# Patient Record
Sex: FEMALE | Race: OTHER | HISPANIC OR LATINO | URBAN - METROPOLITAN AREA
[De-identification: names, ages, dates, MRNs, and addresses within clinical notes are randomized per-mention and may not be internally consistent; named-entity substitution may affect disease eponyms.]

---

## 2017-11-29 ENCOUNTER — EMERGENCY (EMERGENCY)
Facility: HOSPITAL | Age: 42
LOS: 1 days | Discharge: HOME | End: 2017-11-29
Attending: EMERGENCY MEDICINE | Admitting: EMERGENCY MEDICINE
Payer: SELF-PAY

## 2017-11-29 VITALS
HEART RATE: 80 BPM | SYSTOLIC BLOOD PRESSURE: 120 MMHG | OXYGEN SATURATION: 100 % | RESPIRATION RATE: 14 BRPM | DIASTOLIC BLOOD PRESSURE: 70 MMHG

## 2017-11-29 VITALS
TEMPERATURE: 98 F | HEART RATE: 66 BPM | DIASTOLIC BLOOD PRESSURE: 80 MMHG | SYSTOLIC BLOOD PRESSURE: 136 MMHG | OXYGEN SATURATION: 100 % | RESPIRATION RATE: 16 BRPM

## 2017-11-29 DIAGNOSIS — Z90.49 ACQUIRED ABSENCE OF OTHER SPECIFIED PARTS OF DIGESTIVE TRACT: Chronic | ICD-10-CM

## 2017-11-29 LAB
ALBUMIN SERPL ELPH-MCNC: 4 G/DL — SIGNIFICANT CHANGE UP (ref 3.3–5)
ALP SERPL-CCNC: 64 U/L — SIGNIFICANT CHANGE UP (ref 40–120)
ALT FLD-CCNC: 22 U/L — SIGNIFICANT CHANGE UP (ref 4–33)
APPEARANCE UR: CLEAR — SIGNIFICANT CHANGE UP
AST SERPL-CCNC: 27 U/L — SIGNIFICANT CHANGE UP (ref 4–32)
BASOPHILS # BLD AUTO: 0.02 K/UL — SIGNIFICANT CHANGE UP (ref 0–0.2)
BASOPHILS NFR BLD AUTO: 0.3 % — SIGNIFICANT CHANGE UP (ref 0–2)
BILIRUB SERPL-MCNC: 0.2 MG/DL — SIGNIFICANT CHANGE UP (ref 0.2–1.2)
BILIRUB UR-MCNC: NEGATIVE — SIGNIFICANT CHANGE UP
BLOOD UR QL VISUAL: NEGATIVE — SIGNIFICANT CHANGE UP
BUN SERPL-MCNC: 19 MG/DL — SIGNIFICANT CHANGE UP (ref 7–23)
CALCIUM SERPL-MCNC: 8.9 MG/DL — SIGNIFICANT CHANGE UP (ref 8.4–10.5)
CHLORIDE SERPL-SCNC: 106 MMOL/L — SIGNIFICANT CHANGE UP (ref 98–107)
CO2 SERPL-SCNC: 25 MMOL/L — SIGNIFICANT CHANGE UP (ref 22–31)
COLOR SPEC: SIGNIFICANT CHANGE UP
CREAT SERPL-MCNC: 0.74 MG/DL — SIGNIFICANT CHANGE UP (ref 0.5–1.3)
EOSINOPHIL # BLD AUTO: 0.33 K/UL — SIGNIFICANT CHANGE UP (ref 0–0.5)
EOSINOPHIL NFR BLD AUTO: 4.5 % — SIGNIFICANT CHANGE UP (ref 0–6)
GLUCOSE SERPL-MCNC: 115 MG/DL — HIGH (ref 70–99)
GLUCOSE UR-MCNC: NEGATIVE — SIGNIFICANT CHANGE UP
HCT VFR BLD CALC: 32.4 % — LOW (ref 34.5–45)
HGB BLD-MCNC: 9.7 G/DL — LOW (ref 11.5–15.5)
IMM GRANULOCYTES # BLD AUTO: 0.01 # — SIGNIFICANT CHANGE UP
IMM GRANULOCYTES NFR BLD AUTO: 0.1 % — SIGNIFICANT CHANGE UP (ref 0–1.5)
KETONES UR-MCNC: NEGATIVE — SIGNIFICANT CHANGE UP
LEUKOCYTE ESTERASE UR-ACNC: NEGATIVE — SIGNIFICANT CHANGE UP
LIDOCAIN IGE QN: 55 U/L — SIGNIFICANT CHANGE UP (ref 7–60)
LYMPHOCYTES # BLD AUTO: 2.67 K/UL — SIGNIFICANT CHANGE UP (ref 1–3.3)
LYMPHOCYTES # BLD AUTO: 36.1 % — SIGNIFICANT CHANGE UP (ref 13–44)
MCHC RBC-ENTMCNC: 23.2 PG — LOW (ref 27–34)
MCHC RBC-ENTMCNC: 29.9 % — LOW (ref 32–36)
MCV RBC AUTO: 77.5 FL — LOW (ref 80–100)
MONOCYTES # BLD AUTO: 0.85 K/UL — SIGNIFICANT CHANGE UP (ref 0–0.9)
MONOCYTES NFR BLD AUTO: 11.5 % — SIGNIFICANT CHANGE UP (ref 2–14)
MUCOUS THREADS # UR AUTO: SIGNIFICANT CHANGE UP
NEUTROPHILS # BLD AUTO: 3.51 K/UL — SIGNIFICANT CHANGE UP (ref 1.8–7.4)
NEUTROPHILS NFR BLD AUTO: 47.5 % — SIGNIFICANT CHANGE UP (ref 43–77)
NITRITE UR-MCNC: NEGATIVE — SIGNIFICANT CHANGE UP
NRBC # FLD: 0.02 — SIGNIFICANT CHANGE UP
PH UR: 6 — SIGNIFICANT CHANGE UP (ref 4.6–8)
PLATELET # BLD AUTO: 210 K/UL — SIGNIFICANT CHANGE UP (ref 150–400)
PMV BLD: 11.9 FL — SIGNIFICANT CHANGE UP (ref 7–13)
POTASSIUM SERPL-MCNC: 3.9 MMOL/L — SIGNIFICANT CHANGE UP (ref 3.5–5.3)
POTASSIUM SERPL-SCNC: 3.9 MMOL/L — SIGNIFICANT CHANGE UP (ref 3.5–5.3)
PROT SERPL-MCNC: 6.8 G/DL — SIGNIFICANT CHANGE UP (ref 6–8.3)
PROT UR-MCNC: 10 — SIGNIFICANT CHANGE UP
RBC # BLD: 4.18 M/UL — SIGNIFICANT CHANGE UP (ref 3.8–5.2)
RBC # FLD: 17.6 % — HIGH (ref 10.3–14.5)
RBC CASTS # UR COMP ASSIST: SIGNIFICANT CHANGE UP (ref 0–?)
SODIUM SERPL-SCNC: 142 MMOL/L — SIGNIFICANT CHANGE UP (ref 135–145)
SP GR SPEC: 1.02 — SIGNIFICANT CHANGE UP (ref 1–1.03)
SQUAMOUS # UR AUTO: SIGNIFICANT CHANGE UP
UROBILINOGEN FLD QL: NORMAL E.U. — SIGNIFICANT CHANGE UP (ref 0.1–0.2)
WBC # BLD: 7.39 K/UL — SIGNIFICANT CHANGE UP (ref 3.8–10.5)
WBC # FLD AUTO: 7.39 K/UL — SIGNIFICANT CHANGE UP (ref 3.8–10.5)
WBC UR QL: SIGNIFICANT CHANGE UP (ref 0–?)

## 2017-11-29 PROCEDURE — 76830 TRANSVAGINAL US NON-OB: CPT | Mod: 26

## 2017-11-29 PROCEDURE — 74177 CT ABD & PELVIS W/CONTRAST: CPT | Mod: 26

## 2017-11-29 PROCEDURE — 99053 MED SERV 10PM-8AM 24 HR FAC: CPT

## 2017-11-29 PROCEDURE — 99284 EMERGENCY DEPT VISIT MOD MDM: CPT | Mod: 25

## 2017-11-29 RX ORDER — SODIUM CHLORIDE 9 MG/ML
1000 INJECTION INTRAMUSCULAR; INTRAVENOUS; SUBCUTANEOUS ONCE
Qty: 0 | Refills: 0 | Status: COMPLETED | OUTPATIENT
Start: 2017-11-29 | End: 2017-11-29

## 2017-11-29 RX ADMIN — SODIUM CHLORIDE 1000 MILLILITER(S): 9 INJECTION INTRAMUSCULAR; INTRAVENOUS; SUBCUTANEOUS at 07:28

## 2017-11-29 NOTE — ED PROVIDER NOTE - ATTENDING CONTRIBUTION TO CARE
pt presented with RLQ pain that radiated to the back.  Pt Indonesian speaking only but wanted family member to translate.  Never had this type of pain before.  No urinary symptoms.  Some nausea but no fevers or vomiting.  Gen: Well appearing in NAD  Head: NC/AT  Neck: trachea midline  Resp:  No distress  Abd: TTP in the RLQ and mild suprapubic TTP.  No CVA TTP  Ext: no deformities  Neuro:  A&O appears non focal  Skin:  Warm and dry as visualized  Psych:  Normal affect and mood     Pt w pain need to be concerned about possible appy and if not an appy must further consider the possibility of ovarian pathology.  Will start with CT as pt has tubal ligation is not looking to reproduce.  If CT negative will move to TVUS.  Will dispo based on the results of labs and imaging.

## 2017-11-29 NOTE — ED PROVIDER NOTE - MEDICAL DECISION MAKING DETAILS
41 yo F, nonsmoker, with PMH of pre-DM, h/o cholecystectomy (3-4 years ago), h/o constipation presents to ER c/o worsening RLQ pain with radiating to back today.  +RLQ tenderness, no urinary symptoms, no vaginal bleeding/discharge, will obtain Labs, UA, and CT A/P IV contrast, re-assess

## 2017-11-29 NOTE — ED PROVIDER NOTE - PROGRESS NOTE DETAILS
JULIO Dumont: agree with above, pt 41 yo F here for RLQ pain x 1 day. Denies fever chills vomiting diarrhea. + RLQ tenderness on exam. no pelvic complaints, denies vaginal bleeding/discharge. LMP 1 week ago. Will obtain labs, urine, CT. PA KATHARINA: patient lying in bed in NAD. CT Abd/pelvis with normal appendix. TVUS pending. JULIO Galindo - pt signed out to me by JULIO Jones at the shift change - RLQ pain, ct neg, TVUS pending, pt well appearing, afebrile, reports improvement of pain, will continue to monitor. JULIO Galindo - pt signed out to me by JULIO Jones at the shift change - RLQ pain, ct neg, TVUS pending, pt well appearing, afebrile, reports improvement of pain, will continue to monitor. PT NOTED TO be anemic, H&H not significantly changed compared to previous visits, admits to heavy periods, will advise to take iron pills.

## 2017-11-29 NOTE — ED PROVIDER NOTE - PHYSICAL EXAMINATION
GYN exam: +right adnexal tenderness, no cervical motion tenderness, no bleeding or discharge.    Chaperone: JULIO Dumont GYN exam: +right adnexal tenderness, no left adnexal tenderness, no cervical motion tenderness, no bleeding or discharge.    Chaperone: JULIO Dumont GYN Bimanual exam: +right adnexal tenderness, no left adnexal tenderness, no cervical motion tenderness, no bleeding or discharge.    Chaperone: JULIO Dumont

## 2017-11-29 NOTE — ED PROVIDER NOTE - CARE PLAN
Principal Discharge DX:	Abdominal pain Principal Discharge DX:	Abdominal pain  Instructions for follow-up, activity and diet:	PLEASE MAKE SURE THAT YOU START TAKING IRON PILLS (THEY ARE OVER-THE-COUNTER - TAKE THEM TWICE DAILY, YOU MAY TAKE IBUPROFEN 600MG EVERY 8HRS OR TYLENOL 650MG EVERY 6HRS AS NEEDED FOR THE PAIN, START SEEING PRIMARY CARE DOCTOR AS WELL AS GYN DOCTOR AT OUR CLINIC ([phone]). RETURN TO ER FOR FEVER, CHILLS, WORSENING PAIN, BLOOD IN STOOL, DARK STOOLS OR ANY CONCERNS.

## 2017-11-29 NOTE — ED PROVIDER NOTE - PLAN OF CARE
PLEASE MAKE SURE THAT YOU START TAKING IRON PILLS (THEY ARE OVER-THE-COUNTER - TAKE THEM TWICE DAILY, YOU MAY TAKE IBUPROFEN 600MG EVERY 8HRS OR TYLENOL 650MG EVERY 6HRS AS NEEDED FOR THE PAIN, START SEEING PRIMARY CARE DOCTOR AS WELL AS GYN DOCTOR AT OUR CLINIC ([phone]). RETURN TO ER FOR FEVER, CHILLS, WORSENING PAIN, BLOOD IN STOOL, DARK STOOLS OR ANY CONCERNS.

## 2017-11-29 NOTE — ED PROVIDER NOTE - CHPI ED SYMPTOMS NEG
no dysuria/no fever/no blood in stool/no diarrhea/no nausea/no vomiting/no chills/no hematuria/no burning urination

## 2017-11-29 NOTE — ED PROVIDER NOTE - OBJECTIVE STATEMENT
Patient refused ,  wants to translate in Nepali.    43 yo F, nonsmoker, with PMH of pre-DM, h/o cholecystectomy (3-4 years ago), h/o constipation presents to ER c/o worsening RLQ pain with radiating to back today. Pt reports having gradual onset, constant, pinching, "feels like menstrual pain", 7/10, with radiation to back, worse with movement and lying on right side, better with lying down. States taking x2 Tylenol without improvement. denies any fever, chills, n/v/d, dysuria, burning sensation, hematuria, melena, blood in stool, chest pain, SOB, h/o renal stones, or any other complaints.   LMP: last thursday Patient refused ,  wants to translate in Setswana.    43 yo F, nonsmoker, with PMH of pre-DM, h/o cholecystectomy (3-4 years ago), h/o constipation presents to ER c/o worsening RLQ pain with radiating to back today. Pt reports having gradual onset, constant, pinching, "feels like menstrual pain", 7/10, with radiation to back, worse with movement and lying on right side, better with lying down. States taking x2 Tylenol without improvement. Last BM was yesterday, regular, no blood. denies any fever, chills, n/v/d, dysuria, burning sensation, hematuria, melena, blood in stool, chest pain, SOB, h/o renal stones, weight loss, recent travel, or any other complaints.   LMP: last thursday

## 2017-11-30 LAB
BACTERIA UR CULT: SIGNIFICANT CHANGE UP
C TRACH RRNA SPEC QL NAA+PROBE: SIGNIFICANT CHANGE UP
N GONORRHOEA RRNA SPEC QL NAA+PROBE: SIGNIFICANT CHANGE UP
SPECIMEN SOURCE: SIGNIFICANT CHANGE UP
SPECIMEN SOURCE: SIGNIFICANT CHANGE UP

## 2017-12-12 ENCOUNTER — APPOINTMENT (OUTPATIENT)
Dept: OBGYN | Facility: CLINIC | Age: 42
End: 2017-12-12

## 2018-05-10 ENCOUNTER — RESULT CHARGE (OUTPATIENT)
Age: 43
End: 2018-05-10

## 2018-05-10 ENCOUNTER — OUTPATIENT (OUTPATIENT)
Dept: OUTPATIENT SERVICES | Facility: HOSPITAL | Age: 43
LOS: 1 days | End: 2018-05-10
Payer: SELF-PAY

## 2018-05-10 ENCOUNTER — LABORATORY RESULT (OUTPATIENT)
Age: 43
End: 2018-05-10

## 2018-05-10 ENCOUNTER — APPOINTMENT (OUTPATIENT)
Dept: OBGYN | Facility: CLINIC | Age: 43
End: 2018-05-10
Payer: SELF-PAY

## 2018-05-10 VITALS — WEIGHT: 179 LBS | SYSTOLIC BLOOD PRESSURE: 160 MMHG | BODY MASS INDEX: 34.96 KG/M2 | DIASTOLIC BLOOD PRESSURE: 100 MMHG

## 2018-05-10 VITALS — DIASTOLIC BLOOD PRESSURE: 88 MMHG | SYSTOLIC BLOOD PRESSURE: 138 MMHG

## 2018-05-10 DIAGNOSIS — N93.0 POSTCOITAL AND CONTACT BLEEDING: ICD-10-CM

## 2018-05-10 DIAGNOSIS — N76.0 ACUTE VAGINITIS: ICD-10-CM

## 2018-05-10 DIAGNOSIS — Z90.49 ACQUIRED ABSENCE OF OTHER SPECIFIED PARTS OF DIGESTIVE TRACT: Chronic | ICD-10-CM

## 2018-05-10 LAB — HCG UR QL: NEGATIVE

## 2018-05-10 PROCEDURE — 81025 URINE PREGNANCY TEST: CPT | Mod: NC

## 2018-05-10 PROCEDURE — 99203 OFFICE O/P NEW LOW 30 MIN: CPT | Mod: NC

## 2018-05-10 PROCEDURE — 87624 HPV HI-RISK TYP POOLED RSLT: CPT

## 2018-05-10 PROCEDURE — 81025 URINE PREGNANCY TEST: CPT

## 2018-05-10 PROCEDURE — 88175 CYTOPATH C/V AUTO FLUID REDO: CPT

## 2018-05-10 PROCEDURE — G0463: CPT

## 2018-05-11 LAB
C TRACH RRNA SPEC QL NAA+PROBE: SIGNIFICANT CHANGE UP
HPV HIGH+LOW RISK DNA PNL CVX: SIGNIFICANT CHANGE UP
N GONORRHOEA RRNA SPEC QL NAA+PROBE: SIGNIFICANT CHANGE UP
SPECIMEN SOURCE: SIGNIFICANT CHANGE UP

## 2018-05-14 LAB — CYTOLOGY SPEC DOC CYTO: SIGNIFICANT CHANGE UP

## 2018-05-17 DIAGNOSIS — N93.0 POSTCOITAL AND CONTACT BLEEDING: ICD-10-CM

## 2018-05-17 DIAGNOSIS — N92.0 EXCESSIVE AND FREQUENT MENSTRUATION WITH REGULAR CYCLE: ICD-10-CM

## 2018-05-17 DIAGNOSIS — Z11.3 ENCOUNTER FOR SCREENING FOR INFECTIONS WITH A PREDOMINANTLY SEXUAL MODE OF TRANSMISSION: ICD-10-CM

## 2018-05-17 DIAGNOSIS — Z01.419 ENCOUNTER FOR GYNECOLOGICAL EXAMINATION (GENERAL) (ROUTINE) WITHOUT ABNORMAL FINDINGS: ICD-10-CM

## 2018-05-18 ENCOUNTER — OUTPATIENT (OUTPATIENT)
Dept: OUTPATIENT SERVICES | Facility: HOSPITAL | Age: 43
LOS: 1 days | End: 2018-05-18
Payer: SELF-PAY

## 2018-05-18 ENCOUNTER — APPOINTMENT (OUTPATIENT)
Dept: INTERNAL MEDICINE | Facility: CLINIC | Age: 43
End: 2018-05-18

## 2018-05-18 VITALS
DIASTOLIC BLOOD PRESSURE: 80 MMHG | SYSTOLIC BLOOD PRESSURE: 120 MMHG | WEIGHT: 178 LBS | BODY MASS INDEX: 34.95 KG/M2 | HEIGHT: 60 IN

## 2018-05-18 DIAGNOSIS — Z83.3 FAMILY HISTORY OF DIABETES MELLITUS: ICD-10-CM

## 2018-05-18 DIAGNOSIS — Z82.49 FAMILY HISTORY OF ISCHEMIC HEART DISEASE AND OTHER DISEASES OF THE CIRCULATORY SYSTEM: ICD-10-CM

## 2018-05-18 DIAGNOSIS — Z90.49 ACQUIRED ABSENCE OF OTHER SPECIFIED PARTS OF DIGESTIVE TRACT: Chronic | ICD-10-CM

## 2018-05-18 DIAGNOSIS — I10 ESSENTIAL (PRIMARY) HYPERTENSION: ICD-10-CM

## 2018-05-18 PROCEDURE — 90715 TDAP VACCINE 7 YRS/> IM: CPT

## 2018-05-18 PROCEDURE — G0463: CPT

## 2018-05-18 PROCEDURE — 90471 IMMUNIZATION ADMIN: CPT

## 2018-05-18 NOTE — PHYSICAL EXAM
[No Acute Distress] : no acute distress [Well Nourished] : well nourished [Well Developed] : well developed [Well-Appearing] : well-appearing [Normal Sclera/Conjunctiva] : normal sclera/conjunctiva [EOMI] : extraocular movements intact [Normal Outer Ear/Nose] : the outer ears and nose were normal in appearance [Supple] : supple [No Respiratory Distress] : no respiratory distress  [Clear to Auscultation] : lungs were clear to auscultation bilaterally [No Accessory Muscle Use] : no accessory muscle use [Normal Rate] : normal rate  [Regular Rhythm] : with a regular rhythm [Normal S1, S2] : normal S1 and S2 [No Murmur] : no murmur heard [No Edema] : there was no peripheral edema [Soft] : abdomen soft [Non Tender] : non-tender [Non-distended] : non-distended [No Masses] : no abdominal mass palpated [No HSM] : no HSM [Normal Bowel Sounds] : normal bowel sounds [Normal Posterior Cervical Nodes] : no posterior cervical lymphadenopathy [Normal Anterior Cervical Nodes] : no anterior cervical lymphadenopathy [No Spinal Tenderness] : no spinal tenderness [Grossly Normal Strength/Tone] : grossly normal strength/tone [No Rash] : no rash [Normal Gait] : normal gait [Coordination Grossly Intact] : coordination grossly intact [No Focal Deficits] : no focal deficits [Deep Tendon Reflexes (DTR)] : deep tendon reflexes were 2+ and symmetric [Normal Affect] : the affect was normal [Normal Insight/Judgement] : insight and judgment were intact [No Lymphadenopathy] : no lymphadenopathy

## 2018-05-21 NOTE — HISTORY OF PRESENT ILLNESS
[FreeTextEntry8] : [place]  Nichol #[id number]\par \par Patient is a 42 yo F w/PMH of prediabetes presenting to establish care. Patient is 30 minutes late to her appointment so was seen for an acute visit only. She reports that she was previously on medication for prediabetes but has not taken it in a while. She otherwise takes no medications at home\par \par She reports that she was sent by the gynecologist for elevated BP and abdominal bloating/constipation.\par \par She is a never smoker, no EtOH, no recreational drug use. She lives with her . She does not currently work. She is sexually active with her  and is interested in being tested for STIs.\par \par LMP was 5/7. She reports once monthly periods however not completely regular lasting 10 days each.\par \par She reports her parents have hypertension. Her uncle had diabetes.

## 2018-05-21 NOTE — ASSESSMENT
[FreeTextEntry1] : Patient is a 42 yo F w/PMH of prediabetes presenting to establish care\par  \par 1. HCM\par - last pap 5/2018 negative, HRHPV neg\par - mammo referral given at GYN clinic\par - TdaP today\par - CBC, A1c, CMP, Lipid profile, HIV, Syphillis screen today\par \par dw Dr. Lundberg\par \par Sapphire Lara, R2

## 2018-05-21 NOTE — REVIEW OF SYSTEMS
[Constipation] : constipation [Fever] : no fever [Chills] : no chills [Discharge] : no discharge [Pain] : no pain [Itching] : no itching [Earache] : no earache [Hearing Loss] : no hearing loss [Sore Throat] : no sore throat [Chest Pain] : no chest pain [Palpitations] : no palpitations [Shortness Of Breath] : no shortness of breath [Wheezing] : no wheezing [Cough] : no cough [Abdominal Pain] : no abdominal pain [Nausea] : no nausea [Vomiting] : no vomiting [Dysuria] : no dysuria [Joint Pain] : no joint pain [Muscle Pain] : no muscle pain [Skin Rash] : no skin rash [Headache] : no headache [Anxiety] : no anxiety [Depression] : no depression

## 2018-05-23 DIAGNOSIS — Z23 ENCOUNTER FOR IMMUNIZATION: ICD-10-CM

## 2018-05-23 DIAGNOSIS — Z82.49 FAMILY HISTORY OF ISCHEMIC HEART DISEASE AND OTHER DISEASES OF THE CIRCULATORY SYSTEM: ICD-10-CM

## 2018-05-23 DIAGNOSIS — Z83.3 FAMILY HISTORY OF DIABETES MELLITUS: ICD-10-CM

## 2018-05-23 DIAGNOSIS — R73.03 PREDIABETES: ICD-10-CM

## 2018-05-24 LAB
ALBUMIN SERPL ELPH-MCNC: 4.2 G/DL
ALP BLD-CCNC: 81 U/L
ALT SERPL-CCNC: 26 U/L
ANION GAP SERPL CALC-SCNC: 13 MMOL/L
AST SERPL-CCNC: 34 U/L
BASOPHILS # BLD AUTO: 0.01 K/UL
BASOPHILS NFR BLD AUTO: 0.1 %
BILIRUB SERPL-MCNC: <0.2 MG/DL
BUN SERPL-MCNC: 14 MG/DL
CALCIUM SERPL-MCNC: 9 MG/DL
CHLORIDE SERPL-SCNC: 104 MMOL/L
CHOLEST SERPL-MCNC: 168 MG/DL
CHOLEST/HDLC SERPL: 3.2 RATIO
CO2 SERPL-SCNC: 24 MMOL/L
CREAT SERPL-MCNC: 0.84 MG/DL
EOSINOPHIL # BLD AUTO: 0.34 K/UL
EOSINOPHIL NFR BLD AUTO: 4.2 %
GLUCOSE SERPL-MCNC: 109 MG/DL
HBA1C MFR BLD HPLC: 6.9 %
HCT VFR BLD CALC: 33.8 %
HDLC SERPL-MCNC: 52 MG/DL
HGB BLD-MCNC: 10.7 G/DL
HIV1+2 AB SPEC QL IA.RAPID: NONREACTIVE
IMM GRANULOCYTES NFR BLD AUTO: 0.1 %
LDLC SERPL CALC-MCNC: 84 MG/DL
LYMPHOCYTES # BLD AUTO: 2.99 K/UL
LYMPHOCYTES NFR BLD AUTO: 37.1 %
MAN DIFF?: NORMAL
MCHC RBC-ENTMCNC: 24.8 PG
MCHC RBC-ENTMCNC: 31.7 GM/DL
MCV RBC AUTO: 78.4 FL
MONOCYTES # BLD AUTO: 0.67 K/UL
MONOCYTES NFR BLD AUTO: 8.3 %
NEUTROPHILS # BLD AUTO: 4.03 K/UL
NEUTROPHILS NFR BLD AUTO: 50.2 %
PLATELET # BLD AUTO: 221 K/UL
POTASSIUM SERPL-SCNC: 4.8 MMOL/L
PROT SERPL-MCNC: 7.4 G/DL
RBC # BLD: 4.31 M/UL
RBC # FLD: 18.3 %
SODIUM SERPL-SCNC: 141 MMOL/L
T PALLIDUM AB SER QL IA: NEGATIVE
TRIGL SERPL-MCNC: 161 MG/DL
WBC # FLD AUTO: 8.05 K/UL

## 2018-07-23 ENCOUNTER — OUTPATIENT (OUTPATIENT)
Dept: OUTPATIENT SERVICES | Facility: HOSPITAL | Age: 43
LOS: 1 days | End: 2018-07-23
Payer: SELF-PAY

## 2018-07-23 ENCOUNTER — APPOINTMENT (OUTPATIENT)
Dept: INTERNAL MEDICINE | Facility: CLINIC | Age: 43
End: 2018-07-23
Payer: COMMERCIAL

## 2018-07-23 ENCOUNTER — MESSAGE (OUTPATIENT)
Age: 43
End: 2018-07-23

## 2018-07-23 VITALS
BODY MASS INDEX: 35.34 KG/M2 | HEIGHT: 60 IN | DIASTOLIC BLOOD PRESSURE: 70 MMHG | WEIGHT: 180 LBS | SYSTOLIC BLOOD PRESSURE: 140 MMHG

## 2018-07-23 DIAGNOSIS — Z90.49 ACQUIRED ABSENCE OF OTHER SPECIFIED PARTS OF DIGESTIVE TRACT: Chronic | ICD-10-CM

## 2018-07-23 DIAGNOSIS — R73.03 PREDIABETES: ICD-10-CM

## 2018-07-23 DIAGNOSIS — Z83.3 FAMILY HISTORY OF DIABETES MELLITUS: ICD-10-CM

## 2018-07-23 DIAGNOSIS — Z23 ENCOUNTER FOR IMMUNIZATION: ICD-10-CM

## 2018-07-23 DIAGNOSIS — I10 ESSENTIAL (PRIMARY) HYPERTENSION: ICD-10-CM

## 2018-07-23 PROCEDURE — 99213 OFFICE O/P EST LOW 20 MIN: CPT | Mod: GE

## 2018-07-23 NOTE — PHYSICAL EXAM
[No Acute Distress] : no acute distress [Well Nourished] : well nourished [Well Developed] : well developed [Well-Appearing] : well-appearing [Normal Sclera/Conjunctiva] : normal sclera/conjunctiva [Normal Outer Ear/Nose] : the outer ears and nose were normal in appearance [Supple] : supple [No Respiratory Distress] : no respiratory distress  [Clear to Auscultation] : lungs were clear to auscultation bilaterally [Normal Rate] : normal rate  [Regular Rhythm] : with a regular rhythm [Normal S1, S2] : normal S1 and S2 [No Murmur] : no murmur heard [No Edema] : there was no peripheral edema [Soft] : abdomen soft [Non Tender] : non-tender [Non-distended] : non-distended [No Masses] : no abdominal mass palpated [No HSM] : no HSM [Normal Bowel Sounds] : normal bowel sounds [Grossly Normal Strength/Tone] : grossly normal strength/tone [No Rash] : no rash [Normal Gait] : normal gait [Coordination Grossly Intact] : coordination grossly intact [No Focal Deficits] : no focal deficits [Deep Tendon Reflexes (DTR)] : deep tendon reflexes were 2+ and symmetric [Normal Affect] : the affect was normal [Normal Insight/Judgement] : insight and judgment were intact

## 2018-07-24 ENCOUNTER — FORM ENCOUNTER (OUTPATIENT)
Age: 43
End: 2018-07-24

## 2018-07-24 DIAGNOSIS — K59.09 OTHER CONSTIPATION: ICD-10-CM

## 2018-07-24 DIAGNOSIS — D64.9 ANEMIA, UNSPECIFIED: ICD-10-CM

## 2018-07-24 NOTE — ASSESSMENT
[FreeTextEntry1] : 42 yo F w/PMH of T2DM (A1c 6.9% in 5/2018) and microcytic anemia presenting for follow up appointment\par \par 1. Microcytic anemia\par - likely related to heavy menses\par - start ferrous sulfate 325mg daily with senna, colace and Miralax PRN for constipation\par - f/u GYN re: IUD placement\par \par 2. T2DM\par - c/w Metformin 500 QD\par \par RTC 3 months for repeat CBC\par \par dw Dr. Conrad\par \par Sapphire Lara, R2

## 2018-07-24 NOTE — REVIEW OF SYSTEMS
[Constipation] : constipation [Fever] : no fever [Chills] : no chills [Fatigue] : no fatigue [Night Sweats] : no night sweats [Discharge] : no discharge [Pain] : no pain [Hearing Loss] : no hearing loss [Sore Throat] : no sore throat [Shortness Of Breath] : no shortness of breath [Wheezing] : no wheezing [Nausea] : no nausea [Vomiting] : no vomiting [Melena] : no melena [Dysuria] : no dysuria [Hematuria] : no hematuria [Frequency] : no frequency [Joint Pain] : no joint pain [Muscle Pain] : no muscle pain [Fainting] : no fainting [Easy Bleeding] : no easy bleeding

## 2018-07-24 NOTE — HISTORY OF PRESENT ILLNESS
[de-identified] : Patient is a 42 yo F w/PMH of T2DM (A1c 6.9% in 5/2018) and microcytic anemia presenting for follow up appointment. On prior visit, CBC showed a microcytic anemia with Hb of 10.7. \par \par Patient reports very heavy menses. Reports that she bleeds for 10 days per month and is heavy most days. Reports 4-5 tampons a day. She reports this has been going on for 20 years. She reports it started after having her son 22 years ago. She reports she has been told she had anemia in the past but has never been put on iron supplements.\par \par Of note, she also reports chronic constipation for the same period of time. Denies melena, hematochezia, weight loss, early satiety. Reports some lower abdominal discomfort and feeling of bloating after meals occasionally. Notes last BM 4 days ago. Reports she uses Senna, has tried Colace and Dulcolax in the past but never Miralax.

## 2018-07-25 ENCOUNTER — LABORATORY RESULT (OUTPATIENT)
Age: 43
End: 2018-07-25

## 2018-07-25 ENCOUNTER — OUTPATIENT (OUTPATIENT)
Dept: OUTPATIENT SERVICES | Facility: HOSPITAL | Age: 43
LOS: 1 days | End: 2018-07-25
Payer: SELF-PAY

## 2018-07-25 ENCOUNTER — APPOINTMENT (OUTPATIENT)
Dept: ULTRASOUND IMAGING | Facility: HOSPITAL | Age: 43
End: 2018-07-25
Payer: COMMERCIAL

## 2018-07-25 ENCOUNTER — APPOINTMENT (OUTPATIENT)
Dept: OBGYN | Facility: CLINIC | Age: 43
End: 2018-07-25

## 2018-07-25 DIAGNOSIS — Z90.49 ACQUIRED ABSENCE OF OTHER SPECIFIED PARTS OF DIGESTIVE TRACT: Chronic | ICD-10-CM

## 2018-07-25 DIAGNOSIS — Z11.3 ENCOUNTER FOR SCREENING FOR INFECTIONS WITH A PREDOMINANTLY SEXUAL MODE OF TRANSMISSION: ICD-10-CM

## 2018-07-25 DIAGNOSIS — Z01.419 ENCOUNTER FOR GYNECOLOGICAL EXAMINATION (GENERAL) (ROUTINE) WITHOUT ABNORMAL FINDINGS: ICD-10-CM

## 2018-07-25 DIAGNOSIS — N92.0 EXCESSIVE AND FREQUENT MENSTRUATION WITH REGULAR CYCLE: ICD-10-CM

## 2018-07-25 DIAGNOSIS — N93.0 POSTCOITAL AND CONTACT BLEEDING: ICD-10-CM

## 2018-07-25 LAB — HCG UR QL: NEGATIVE — SIGNIFICANT CHANGE UP

## 2018-07-25 PROCEDURE — 58340 CATHETER FOR HYSTEROGRAPHY: CPT

## 2018-07-25 PROCEDURE — 76831 ECHO EXAM UTERUS: CPT

## 2018-07-25 PROCEDURE — G0463: CPT

## 2018-07-25 PROCEDURE — 81025 URINE PREGNANCY TEST: CPT

## 2018-07-25 PROCEDURE — 76831 ECHO EXAM UTERUS: CPT | Mod: 26

## 2018-07-30 ENCOUNTER — OUTPATIENT (OUTPATIENT)
Dept: OUTPATIENT SERVICES | Facility: HOSPITAL | Age: 43
LOS: 1 days | End: 2018-07-30
Payer: SELF-PAY

## 2018-07-30 ENCOUNTER — APPOINTMENT (OUTPATIENT)
Dept: OBGYN | Facility: CLINIC | Age: 43
End: 2018-07-30
Payer: COMMERCIAL

## 2018-07-30 DIAGNOSIS — Z90.49 ACQUIRED ABSENCE OF OTHER SPECIFIED PARTS OF DIGESTIVE TRACT: Chronic | ICD-10-CM

## 2018-07-30 DIAGNOSIS — N76.0 ACUTE VAGINITIS: ICD-10-CM

## 2018-07-30 PROCEDURE — 99213 OFFICE O/P EST LOW 20 MIN: CPT | Mod: GE

## 2018-07-30 PROCEDURE — G0463: CPT

## 2018-07-31 DIAGNOSIS — K59.09 OTHER CONSTIPATION: ICD-10-CM

## 2018-08-13 DIAGNOSIS — N92.0 EXCESSIVE AND FREQUENT MENSTRUATION WITH REGULAR CYCLE: ICD-10-CM

## 2018-09-06 ENCOUNTER — EMERGENCY (EMERGENCY)
Facility: HOSPITAL | Age: 43
LOS: 1 days | Discharge: HOME | End: 2018-09-06
Attending: EMERGENCY MEDICINE | Admitting: EMERGENCY MEDICINE
Payer: SELF-PAY

## 2018-09-06 VITALS
HEART RATE: 62 BPM | RESPIRATION RATE: 18 BRPM | SYSTOLIC BLOOD PRESSURE: 134 MMHG | DIASTOLIC BLOOD PRESSURE: 72 MMHG | TEMPERATURE: 98 F | OXYGEN SATURATION: 100 %

## 2018-09-06 VITALS
RESPIRATION RATE: 16 BRPM | DIASTOLIC BLOOD PRESSURE: 63 MMHG | HEART RATE: 74 BPM | SYSTOLIC BLOOD PRESSURE: 146 MMHG | OXYGEN SATURATION: 100 % | TEMPERATURE: 99 F

## 2018-09-06 DIAGNOSIS — Z90.49 ACQUIRED ABSENCE OF OTHER SPECIFIED PARTS OF DIGESTIVE TRACT: Chronic | ICD-10-CM

## 2018-09-06 DIAGNOSIS — Z98.51 TUBAL LIGATION STATUS: Chronic | ICD-10-CM

## 2018-09-06 LAB
ALBUMIN SERPL ELPH-MCNC: 4 G/DL — SIGNIFICANT CHANGE UP (ref 3.3–5)
ALP SERPL-CCNC: 90 U/L — SIGNIFICANT CHANGE UP (ref 40–120)
ALT FLD-CCNC: 22 U/L — SIGNIFICANT CHANGE UP (ref 4–33)
APPEARANCE UR: SIGNIFICANT CHANGE UP
AST SERPL-CCNC: 23 U/L — SIGNIFICANT CHANGE UP (ref 4–32)
BACTERIA # UR AUTO: HIGH
BASOPHILS # BLD AUTO: 0.02 K/UL — SIGNIFICANT CHANGE UP (ref 0–0.2)
BASOPHILS NFR BLD AUTO: 0.2 % — SIGNIFICANT CHANGE UP (ref 0–2)
BILIRUB SERPL-MCNC: < 0.2 MG/DL — LOW (ref 0.2–1.2)
BILIRUB UR-MCNC: NEGATIVE — SIGNIFICANT CHANGE UP
BLOOD UR QL VISUAL: HIGH
BUN SERPL-MCNC: 16 MG/DL — SIGNIFICANT CHANGE UP (ref 7–23)
CALCIUM SERPL-MCNC: 8.7 MG/DL — SIGNIFICANT CHANGE UP (ref 8.4–10.5)
CHLORIDE SERPL-SCNC: 102 MMOL/L — SIGNIFICANT CHANGE UP (ref 98–107)
CO2 SERPL-SCNC: 22 MMOL/L — SIGNIFICANT CHANGE UP (ref 22–31)
COLOR SPEC: YELLOW — SIGNIFICANT CHANGE UP
CREAT SERPL-MCNC: 0.73 MG/DL — SIGNIFICANT CHANGE UP (ref 0.5–1.3)
EOSINOPHIL # BLD AUTO: 0.3 K/UL — SIGNIFICANT CHANGE UP (ref 0–0.5)
EOSINOPHIL NFR BLD AUTO: 3.3 % — SIGNIFICANT CHANGE UP (ref 0–6)
GLUCOSE SERPL-MCNC: 159 MG/DL — HIGH (ref 70–99)
GLUCOSE UR-MCNC: 70 — SIGNIFICANT CHANGE UP
HCT VFR BLD CALC: 32.2 % — LOW (ref 34.5–45)
HGB BLD-MCNC: 9.9 G/DL — LOW (ref 11.5–15.5)
HYALINE CASTS # UR AUTO: HIGH
IMM GRANULOCYTES # BLD AUTO: 0.02 # — SIGNIFICANT CHANGE UP
IMM GRANULOCYTES NFR BLD AUTO: 0.2 % — SIGNIFICANT CHANGE UP (ref 0–1.5)
KETONES UR-MCNC: NEGATIVE — SIGNIFICANT CHANGE UP
LEUKOCYTE ESTERASE UR-ACNC: SIGNIFICANT CHANGE UP
LYMPHOCYTES # BLD AUTO: 3.02 K/UL — SIGNIFICANT CHANGE UP (ref 1–3.3)
LYMPHOCYTES # BLD AUTO: 32.8 % — SIGNIFICANT CHANGE UP (ref 13–44)
MCHC RBC-ENTMCNC: 23.5 PG — LOW (ref 27–34)
MCHC RBC-ENTMCNC: 30.7 % — LOW (ref 32–36)
MCV RBC AUTO: 76.5 FL — LOW (ref 80–100)
MONOCYTES # BLD AUTO: 0.99 K/UL — HIGH (ref 0–0.9)
MONOCYTES NFR BLD AUTO: 10.8 % — SIGNIFICANT CHANGE UP (ref 2–14)
NEUTROPHILS # BLD AUTO: 4.85 K/UL — SIGNIFICANT CHANGE UP (ref 1.8–7.4)
NEUTROPHILS NFR BLD AUTO: 52.7 % — SIGNIFICANT CHANGE UP (ref 43–77)
NITRITE UR-MCNC: NEGATIVE — SIGNIFICANT CHANGE UP
NRBC # FLD: 0 — SIGNIFICANT CHANGE UP
PH UR: 6.5 — SIGNIFICANT CHANGE UP (ref 5–8)
PLATELET # BLD AUTO: 210 K/UL — SIGNIFICANT CHANGE UP (ref 150–400)
PMV BLD: 11.8 FL — SIGNIFICANT CHANGE UP (ref 7–13)
POTASSIUM SERPL-MCNC: 3.9 MMOL/L — SIGNIFICANT CHANGE UP (ref 3.5–5.3)
POTASSIUM SERPL-SCNC: 3.9 MMOL/L — SIGNIFICANT CHANGE UP (ref 3.5–5.3)
PROT SERPL-MCNC: 7.4 G/DL — SIGNIFICANT CHANGE UP (ref 6–8.3)
PROT UR-MCNC: 100 — HIGH
RBC # BLD: 4.21 M/UL — SIGNIFICANT CHANGE UP (ref 3.8–5.2)
RBC # FLD: 16.6 % — HIGH (ref 10.3–14.5)
RBC CASTS # UR COMP ASSIST: >50 — HIGH (ref 0–?)
SODIUM SERPL-SCNC: 138 MMOL/L — SIGNIFICANT CHANGE UP (ref 135–145)
SP GR SPEC: 1.02 — SIGNIFICANT CHANGE UP (ref 1–1.04)
SQUAMOUS # UR AUTO: SIGNIFICANT CHANGE UP
UROBILINOGEN FLD QL: NORMAL — SIGNIFICANT CHANGE UP
WBC # BLD: 9.2 K/UL — SIGNIFICANT CHANGE UP (ref 3.8–10.5)
WBC # FLD AUTO: 9.2 K/UL — SIGNIFICANT CHANGE UP (ref 3.8–10.5)
WBC UR QL: >50 — HIGH (ref 0–?)

## 2018-09-06 PROCEDURE — 99053 MED SERV 10PM-8AM 24 HR FAC: CPT

## 2018-09-06 PROCEDURE — 76770 US EXAM ABDO BACK WALL COMP: CPT | Mod: 26

## 2018-09-06 PROCEDURE — 99284 EMERGENCY DEPT VISIT MOD MDM: CPT | Mod: 25

## 2018-09-06 RX ORDER — CIPROFLOXACIN LACTATE 400MG/40ML
500 VIAL (ML) INTRAVENOUS ONCE
Qty: 0 | Refills: 0 | Status: COMPLETED | OUTPATIENT
Start: 2018-09-06 | End: 2018-09-06

## 2018-09-06 RX ORDER — MOXIFLOXACIN HYDROCHLORIDE TABLETS, 400 MG 400 MG/1
1 TABLET, FILM COATED ORAL
Qty: 13 | Refills: 0
Start: 2018-09-06 | End: 2018-09-12

## 2018-09-06 RX ORDER — KETOROLAC TROMETHAMINE 30 MG/ML
15 SYRINGE (ML) INJECTION ONCE
Qty: 0 | Refills: 0 | Status: DISCONTINUED | OUTPATIENT
Start: 2018-09-06 | End: 2018-09-06

## 2018-09-06 RX ADMIN — Medication 15 MILLIGRAM(S): at 04:24

## 2018-09-06 RX ADMIN — Medication 500 MILLIGRAM(S): at 05:44

## 2018-09-06 RX ADMIN — Medication 15 MILLIGRAM(S): at 04:04

## 2018-09-06 NOTE — ED PROVIDER NOTE - OBJECTIVE STATEMENT
44 yo f with pmhx of diabetes mellitus and microcytic anemia who presents for left flank pain. She started having burning on urination on last Thursday, she started taking pyridium with little improvement. Yesterday she developed left flank pain around 7pm, took some acetaminophen without improvement. She denied any fever, chills, sob, nausea, vomiting, night sweats, hematuria, dizziness.

## 2018-09-06 NOTE — ED ADULT NURSE REASSESSMENT NOTE - NS ED NURSE REASSESS COMMENT FT1
Patient received AA&Ox3. Patient states that she is more comfortable at this time and does not need medication. VSS on RA. Patient denies chest pain, N/V, SOB, fever, chills, dyspnea, dizziness at this time. Patient awaiting results of USabd, NAD noted - will continue to monitor.

## 2018-09-06 NOTE — ED PROVIDER NOTE - NS ED ROS FT
Constitutional: No Fever, Fatigue, Weight Changes  Eyes: No recent vision problems or eye pain.  ENT: No congestion, ear pain, or sore throat.  Endocrine: No excess sweating, temperature intolerance  Cardiovascular: No chest pain, palpitations, shortness of breath, pre-syncope, syncope  Respiratory: No cough, congestion, or wheezing.  Gastrointestinal: No abdominal pain, nausea, vomiting, or diarrhea.  Genitourinary: Dysuria. No hematuria.   Musculoskeletal: No joint swelling, joint pain  Neurologic: No headache, dizziness, focal weakness  Skin: No rashes, hematoma, prupura

## 2018-09-06 NOTE — ED PROVIDER NOTE - PROGRESS NOTE DETAILS
Roberto Carlos - Pt signed out to me pending US results current Dx pyelonephritis plan for outpatient abx if US without hydro. Noted incidentaloma on US, spoke with pt regarding results as well as recommended follow up per radiology. Will give her copy of US report.

## 2018-09-06 NOTE — ED PROVIDER NOTE - MEDICAL DECISION MAKING DETAILS
42 yo f with pmhx of diabetes mellitus and microcytic anemia who presents for left flank pain. CVA tenderness on the left side. CBC, CMP, U/A. Urine culture. IM toradol.

## 2018-09-06 NOTE — ED ADULT TRIAGE NOTE - CHIEF COMPLAINT QUOTE
pt arrives w/ c/o left flank pain radiating to LLQ abdomen. pt states pain began last week. pt denies any nausea or vomiting. pt also c/o dysuria. pt states took Tylenol 2 hrs ago for pain with minimal relief.

## 2018-09-06 NOTE — DISCUSSION/SUMMARY
[FreeTextEntry1] : 43F h/o DM2, anemia who presented with L flank pain.  Pt thought to have pyelo without hydro on renal U/S.  Pt also found to have renal cyst that requires f/u.  Pt was discharged on ciprofloxacin.  Pt has appt 10/23/18.  Will task  team for earlier appointment for post-ED visit.

## 2018-09-06 NOTE — ED PROVIDER NOTE - ATTENDING CONTRIBUTION TO CARE
MD Garcia:  I performed a face to face bedside interview with patient regarding history of present illness, review of symptoms and past medical history. I completed an independent physical exam(documented below).  I have discussed patient's plan of care with resident.   I agree with note as stated above, having amended the EMR as needed to reflect my findings. I have discussed the assessment and plan of care.  This includes during the time I functioned as the attending physician for this patient.  PE:  Gen: Alert, mild distress  Head: NC, AT,  EOMI, normal lids/conjunctiva  ENT:  normal hearing, patent oropharynx without erythema/exudate  Neck: +supple, no tenderness/meningismus/JVD, +Trachea midline  Chest: no chest wall tenderness, equal chest rise  Pulm: Bilateral BS, normal resp effort, no wheeze/stridor/retractions  CV: RRR, no M/R/G, +dist pulses  Abd: +BS, soft, NT/ND  Back: +L cva ttp  Rectal: deferred  Mskel: no edema/erythema/cyanosis  Skin: no rash  Neuro: AAOx3  MDM:  42yo F w/ pmh of microcytic anemia and DM, c/o dysuria since last Thurs, progressed to include L flank pain ystdy, associated w/ nausea, no emesis. Denies hx of stones. Denies fever or chills. UA positive, awaiting US to r/o infected stone. If no hydro, dc w/ abx and outpt pmd f/u.

## 2018-09-07 PROBLEM — D64.9 ANEMIA, UNSPECIFIED: Chronic | Status: ACTIVE | Noted: 2018-09-06

## 2018-09-07 LAB — SPECIMEN SOURCE: SIGNIFICANT CHANGE UP

## 2018-09-09 LAB
-  AMIKACIN: SIGNIFICANT CHANGE UP
-  AMPICILLIN/SULBACTAM: SIGNIFICANT CHANGE UP
-  AMPICILLIN: SIGNIFICANT CHANGE UP
-  AZTREONAM: SIGNIFICANT CHANGE UP
-  CEFAZOLIN: SIGNIFICANT CHANGE UP
-  CEFEPIME: SIGNIFICANT CHANGE UP
-  CEFOXITIN: SIGNIFICANT CHANGE UP
-  CEFTAZIDIME: SIGNIFICANT CHANGE UP
-  CEFTRIAXONE: SIGNIFICANT CHANGE UP
-  CIPROFLOXACIN: SIGNIFICANT CHANGE UP
-  ERTAPENEM: SIGNIFICANT CHANGE UP
-  GENTAMICIN: SIGNIFICANT CHANGE UP
-  IMIPENEM: SIGNIFICANT CHANGE UP
-  LEVOFLOXACIN: SIGNIFICANT CHANGE UP
-  MEROPENEM: SIGNIFICANT CHANGE UP
-  NITROFURANTOIN: SIGNIFICANT CHANGE UP
-  PIPERACILLIN/TAZOBACTAM: SIGNIFICANT CHANGE UP
-  TIGECYCLINE: SIGNIFICANT CHANGE UP
-  TOBRAMYCIN: SIGNIFICANT CHANGE UP
-  TRIMETHOPRIM/SULFAMETHOXAZOLE: SIGNIFICANT CHANGE UP
BACTERIA UR CULT: SIGNIFICANT CHANGE UP
METHOD TYPE: SIGNIFICANT CHANGE UP
ORGANISM # SPEC MICROSCOPIC CNT: SIGNIFICANT CHANGE UP
ORGANISM # SPEC MICROSCOPIC CNT: SIGNIFICANT CHANGE UP

## 2018-09-11 ENCOUNTER — APPOINTMENT (OUTPATIENT)
Dept: INTERNAL MEDICINE | Facility: CLINIC | Age: 43
End: 2018-09-11

## 2018-09-11 ENCOUNTER — OUTPATIENT (OUTPATIENT)
Dept: OUTPATIENT SERVICES | Facility: HOSPITAL | Age: 43
LOS: 1 days | End: 2018-09-11
Payer: SELF-PAY

## 2018-09-11 VITALS
DIASTOLIC BLOOD PRESSURE: 70 MMHG | SYSTOLIC BLOOD PRESSURE: 120 MMHG | BODY MASS INDEX: 35.34 KG/M2 | HEIGHT: 60 IN | WEIGHT: 180 LBS

## 2018-09-11 DIAGNOSIS — Z90.49 ACQUIRED ABSENCE OF OTHER SPECIFIED PARTS OF DIGESTIVE TRACT: Chronic | ICD-10-CM

## 2018-09-11 DIAGNOSIS — I10 ESSENTIAL (PRIMARY) HYPERTENSION: ICD-10-CM

## 2018-09-11 DIAGNOSIS — K59.09 OTHER CONSTIPATION: ICD-10-CM

## 2018-09-11 DIAGNOSIS — Z98.51 TUBAL LIGATION STATUS: Chronic | ICD-10-CM

## 2018-09-11 LAB — HBA1C MFR BLD HPLC: 6.8

## 2018-09-11 PROCEDURE — 83036 HEMOGLOBIN GLYCOSYLATED A1C: CPT

## 2018-09-11 PROCEDURE — G0463: CPT

## 2018-09-12 NOTE — ASSESSMENT
[FreeTextEntry1] : 42 yo female with PMHx DM and anemia who comes in after ed visit.\par \par #DM \par - POCT HgbA1c 6.8 \par - c/w Metformin 500mg daily \par \par #Anemia \par - Ferrous sulfate prescribed \par - Miralax daily for constipation. \par - Follow up in a few months for repeat cbc to monitor anemia. \par \par #Complex renal cyst \par - US showed Left kidney complex cyst, for which they recommended follow up in 6 months with contrast enhanced MRI. \par - Pt also endorsed hyperpigmentation in her face, will continue monitoring. Will consider work up of acth if worsening. \par \par #HCM\par - Pneumovax today\par - Mammogram referral provided \par \par RTC in 4 months for cpe

## 2018-09-12 NOTE — REVIEW OF SYSTEMS
[Fatigue] : fatigue [Negative] : Heme/Lymph [Hot Flashes] : no hot flashes [Sore Throat] : no sore throat [Chest Pain] : no chest pain [Palpitations] : no palpitations [Dysuria] : no dysuria [Hematuria] : no hematuria

## 2018-09-12 NOTE — HISTORY OF PRESENT ILLNESS
[Spouse] : spouse [FreeTextEntry8] : 42 yo female with PMHx DM and anemia who comes in after ed visit. Patient was found R pyelonephritis, and is currently finishing antibiotics. She denied any fever, chills, night sweats. She endorses that her flank pain, and burning on urination have resolved. Endorses minimal abdominal discomfort located in her LLQ, she feels like a pulling sensation. She endorses that sometimes she sometimes takes metformin every other day, due to making her feel queezy. She denied any chest pain, abdominal pain, nausea, vomiting, diarrhea. Her menstrual period improved. Mother had ovarian cancer at age 49. Patient also endorses increased hyperpigmentation in her face. \par \par Of note, she underwent a [facility], which found Left kidney complex cyst, for which they recommended follow up in 6 months with contrast enhanced MRI.

## 2018-09-20 DIAGNOSIS — D64.9 ANEMIA, UNSPECIFIED: ICD-10-CM

## 2018-09-20 DIAGNOSIS — E11.9 TYPE 2 DIABETES MELLITUS WITHOUT COMPLICATIONS: ICD-10-CM

## 2018-09-20 DIAGNOSIS — N28.1 CYST OF KIDNEY, ACQUIRED: ICD-10-CM

## 2018-10-23 ENCOUNTER — APPOINTMENT (OUTPATIENT)
Dept: INTERNAL MEDICINE | Facility: CLINIC | Age: 43
End: 2018-10-23

## 2018-11-26 ENCOUNTER — FORM ENCOUNTER (OUTPATIENT)
Age: 43
End: 2018-11-26

## 2018-11-27 ENCOUNTER — APPOINTMENT (OUTPATIENT)
Dept: MAMMOGRAPHY | Facility: IMAGING CENTER | Age: 43
End: 2018-11-27
Payer: COMMERCIAL

## 2018-11-27 ENCOUNTER — OUTPATIENT (OUTPATIENT)
Dept: OUTPATIENT SERVICES | Facility: HOSPITAL | Age: 43
LOS: 1 days | End: 2018-11-27
Payer: SELF-PAY

## 2018-11-27 DIAGNOSIS — E11.9 TYPE 2 DIABETES MELLITUS WITHOUT COMPLICATIONS: ICD-10-CM

## 2018-11-27 DIAGNOSIS — D64.9 ANEMIA, UNSPECIFIED: ICD-10-CM

## 2018-11-27 DIAGNOSIS — Z98.51 TUBAL LIGATION STATUS: Chronic | ICD-10-CM

## 2018-11-27 DIAGNOSIS — Z00.8 ENCOUNTER FOR OTHER GENERAL EXAMINATION: ICD-10-CM

## 2018-11-27 DIAGNOSIS — N28.1 CYST OF KIDNEY, ACQUIRED: ICD-10-CM

## 2018-11-27 DIAGNOSIS — Z90.49 ACQUIRED ABSENCE OF OTHER SPECIFIED PARTS OF DIGESTIVE TRACT: Chronic | ICD-10-CM

## 2018-11-27 PROCEDURE — 77067 SCR MAMMO BI INCL CAD: CPT

## 2018-11-27 PROCEDURE — 77063 BREAST TOMOSYNTHESIS BI: CPT

## 2018-11-27 PROCEDURE — 77067 SCR MAMMO BI INCL CAD: CPT | Mod: 26

## 2018-11-27 PROCEDURE — 77063 BREAST TOMOSYNTHESIS BI: CPT | Mod: 26

## 2018-12-03 ENCOUNTER — FORM ENCOUNTER (OUTPATIENT)
Age: 43
End: 2018-12-03

## 2018-12-04 ENCOUNTER — OUTPATIENT (OUTPATIENT)
Dept: OUTPATIENT SERVICES | Facility: HOSPITAL | Age: 43
LOS: 1 days | End: 2018-12-04
Payer: SELF-PAY

## 2018-12-04 ENCOUNTER — APPOINTMENT (OUTPATIENT)
Dept: ULTRASOUND IMAGING | Facility: IMAGING CENTER | Age: 43
End: 2018-12-04
Payer: COMMERCIAL

## 2018-12-04 ENCOUNTER — APPOINTMENT (OUTPATIENT)
Dept: MAMMOGRAPHY | Facility: IMAGING CENTER | Age: 43
End: 2018-12-04
Payer: COMMERCIAL

## 2018-12-04 DIAGNOSIS — Z98.51 TUBAL LIGATION STATUS: Chronic | ICD-10-CM

## 2018-12-04 DIAGNOSIS — Z90.49 ACQUIRED ABSENCE OF OTHER SPECIFIED PARTS OF DIGESTIVE TRACT: Chronic | ICD-10-CM

## 2018-12-04 DIAGNOSIS — Z00.8 ENCOUNTER FOR OTHER GENERAL EXAMINATION: ICD-10-CM

## 2018-12-04 PROCEDURE — 77066 DX MAMMO INCL CAD BI: CPT | Mod: 26

## 2018-12-04 PROCEDURE — 76642 ULTRASOUND BREAST LIMITED: CPT | Mod: 26,50

## 2018-12-04 PROCEDURE — 77066 DX MAMMO INCL CAD BI: CPT

## 2018-12-04 PROCEDURE — 76642 ULTRASOUND BREAST LIMITED: CPT

## 2018-12-28 ENCOUNTER — APPOINTMENT (OUTPATIENT)
Dept: INTERNAL MEDICINE | Facility: CLINIC | Age: 43
End: 2018-12-28

## 2018-12-28 ENCOUNTER — OUTPATIENT (OUTPATIENT)
Dept: OUTPATIENT SERVICES | Facility: HOSPITAL | Age: 43
LOS: 1 days | End: 2018-12-28
Payer: SELF-PAY

## 2018-12-28 VITALS
SYSTOLIC BLOOD PRESSURE: 120 MMHG | DIASTOLIC BLOOD PRESSURE: 80 MMHG | BODY MASS INDEX: 35.73 KG/M2 | HEIGHT: 60 IN | WEIGHT: 182 LBS

## 2018-12-28 DIAGNOSIS — I10 ESSENTIAL (PRIMARY) HYPERTENSION: ICD-10-CM

## 2018-12-28 DIAGNOSIS — Z90.49 ACQUIRED ABSENCE OF OTHER SPECIFIED PARTS OF DIGESTIVE TRACT: Chronic | ICD-10-CM

## 2018-12-28 DIAGNOSIS — Z98.51 TUBAL LIGATION STATUS: Chronic | ICD-10-CM

## 2018-12-28 LAB
BILIRUB UR QL STRIP: NORMAL
CLARITY UR: CLEAR
COLLECTION METHOD: NORMAL
GLUCOSE UR-MCNC: >1000
HCG UR QL: 0.2 EU/DL
HGB UR QL STRIP.AUTO: NORMAL
KETONES UR-MCNC: NORMAL
LEUKOCYTE ESTERASE UR QL STRIP: NORMAL
NITRITE UR QL STRIP: NORMAL
PH UR STRIP: 5
PROT UR STRIP-MCNC: NORMAL
SP GR UR STRIP: 1.02

## 2018-12-28 PROCEDURE — G0463: CPT

## 2019-01-02 NOTE — REVIEW OF SYSTEMS
[Joint Pain] : joint pain [Back Pain] : back pain [Fever] : no fever [Chills] : no chills [Fatigue] : no fatigue [Vision Problems] : no vision problems [Sore Throat] : no sore throat [Chest Pain] : no chest pain [Palpitations] : no palpitations [Lower Ext Edema] : no lower extremity edema [Shortness Of Breath] : no shortness of breath [Wheezing] : no wheezing [Cough] : no cough [Dyspnea on Exertion] : no dyspnea on exertion [Abdominal Pain] : no abdominal pain [Nausea] : no nausea [Constipation] : no constipation [Diarrhea] : diarrhea [Dysuria] : no dysuria [Muscle Weakness] : no muscle weakness [Muscle Pain] : no muscle pain [Dizziness] : no dizziness

## 2019-01-02 NOTE — ASSESSMENT
[FreeTextEntry1] : 42 yo female with PMHx DM, anemia, R pyelonephritis, Left renal Complex cyst who presents for follow up. \par \par #Uncontrolled T2DM \par - POCT HgbA1c 8.1 due to medication non-adherance. \par - c/w Metformin 500mg twice daily. \par - Diabetic foot exam- wnl. \par - Recent opthalmology appointment\par - Microalbumin in the visit - wnl. \par - Will discuss the role of starting a statin in next visit. \par - Diabetes educator referral provided, to see in the interim in between appointments. \par \par #BreastMass \par - Repeat mammography in 6 months from dec/2018. \par \par #Anemia \par - Ferrous sulfate and multivitamin ordered. \par - Miralax daily for constipation. \par - Follow up in a few months for repeat cbc to monitor anemia. \par \par #Complex renal cyst \par - US showed Left kidney complex cyst, for which they recommended follow up in 6 months with contrast enhanced MRI.  \par \par #MorbidObestiy\par - Ordered for phentermine 37.5mg daily. \par \par #HCM\par - Flu vaccine today. \par - Mammogram and us in june.\par - Renal complex cyst MRI in march. \par \par Discussed with Dr. Lundberg \par \par Jarred Arreguin PGY-2 \par \par RTC for cpe

## 2019-01-02 NOTE — PHYSICAL EXAM
[No Acute Distress] : no acute distress [Well Developed] : well developed [Normal Sclera/Conjunctiva] : normal sclera/conjunctiva [EOMI] : extraocular movements intact [Normal Outer Ear/Nose] : the outer ears and nose were normal in appearance [Normal Oropharynx] : the oropharynx was normal [No JVD] : no jugular venous distention [Supple] : supple [No Lymphadenopathy] : no lymphadenopathy [No Respiratory Distress] : no respiratory distress  [Clear to Auscultation] : lungs were clear to auscultation bilaterally [Normal Rate] : normal rate  [Regular Rhythm] : with a regular rhythm [Normal S1, S2] : normal S1 and S2 [No Murmur] : no murmur heard [No Edema] : there was no peripheral edema [No Extremity Clubbing/Cyanosis] : no extremity clubbing/cyanosis [Soft] : abdomen soft [Non-distended] : non-distended [Normal Bowel Sounds] : normal bowel sounds [Normal Posterior Cervical Nodes] : no posterior cervical lymphadenopathy [Normal Anterior Cervical Nodes] : no anterior cervical lymphadenopathy [No Spinal Tenderness] : no spinal tenderness [Normal Gait] : normal gait [Coordination Grossly Intact] : coordination grossly intact [No Focal Deficits] : no focal deficits [Comprehensive Foot Exam Normal] : Right and left foot were examined and both feet are normal. No ulcers in either foot. Toes are normal and with full ROM.  Normal tactile sensation with monofilament testing throughout both feet [de-identified] : supra pubic tenderness  [de-identified] : Mild CVA tenderness.

## 2019-01-02 NOTE — HEALTH RISK ASSESSMENT
[No falls in past year] : Patient reported no falls in the past year [0] : 2) Feeling down, depressed, or hopeless: Not at all (0) [] : No [[id number]] : 0

## 2019-01-02 NOTE — HISTORY OF PRESENT ILLNESS
[Spouse] : spouse [FreeTextEntry1] : Follow up  [de-identified] : 44 yo female with PMHx DM and anemia, R pyelonephritis, Renal Complex cyst who presents for follow up. Patient had stop medication due to she was forgetting to take it and she was not usually in the house to take it. She also stopped taking iron tablets for her anemia. She is also asking for phentermine 37.5mg for helping with weight loss. Her left flank pain comes and goes. She says her pain is tolerable, no need for medications. She denies hematuria, dysuria, fever, chills. Breast ultrasound done, birads 3, repeat in 6 months. She visited ophthalmologist 15 days ago, whom prescribed eye glasses. \par \par She complains of pain in her left hip which radiates down her leg. She also complains of bilateral knee pain, exacerbated by cold. She denied any knee pain when ambulating, or any trouble ambulating. She complains of pain in her fingers, with the cold also. She endorsed history of rheumatoid arthritis, by her mother and grandmother. Mother had ovarian cancer at age 49. Of note, she underwent a [facility] in september, which found Left kidney complex cyst, for which they recommended follow up in 6 months with contrast enhanced MRI. \par

## 2019-01-08 DIAGNOSIS — E11.9 TYPE 2 DIABETES MELLITUS WITHOUT COMPLICATIONS: ICD-10-CM

## 2019-01-08 DIAGNOSIS — E66.01 MORBID (SEVERE) OBESITY DUE TO EXCESS CALORIES: ICD-10-CM

## 2019-01-08 DIAGNOSIS — D64.9 ANEMIA, UNSPECIFIED: ICD-10-CM

## 2019-01-08 DIAGNOSIS — N28.1 CYST OF KIDNEY, ACQUIRED: ICD-10-CM

## 2019-01-08 DIAGNOSIS — R10.819 ABDOMINAL TENDERNESS, UNSPECIFIED SITE: ICD-10-CM

## 2019-03-21 ENCOUNTER — EMERGENCY (EMERGENCY)
Facility: HOSPITAL | Age: 44
LOS: 1 days | Discharge: HOME | End: 2019-03-21
Admitting: EMERGENCY MEDICINE
Payer: MEDICAID

## 2019-03-21 VITALS
DIASTOLIC BLOOD PRESSURE: 85 MMHG | HEART RATE: 98 BPM | OXYGEN SATURATION: 100 % | SYSTOLIC BLOOD PRESSURE: 161 MMHG | TEMPERATURE: 98 F | RESPIRATION RATE: 16 BRPM

## 2019-03-21 VITALS
RESPIRATION RATE: 16 BRPM | SYSTOLIC BLOOD PRESSURE: 124 MMHG | DIASTOLIC BLOOD PRESSURE: 79 MMHG | OXYGEN SATURATION: 99 % | HEART RATE: 74 BPM

## 2019-03-21 DIAGNOSIS — Z90.49 ACQUIRED ABSENCE OF OTHER SPECIFIED PARTS OF DIGESTIVE TRACT: Chronic | ICD-10-CM

## 2019-03-21 DIAGNOSIS — Z98.51 TUBAL LIGATION STATUS: Chronic | ICD-10-CM

## 2019-03-21 LAB
ALBUMIN SERPL ELPH-MCNC: 4.3 G/DL — SIGNIFICANT CHANGE UP (ref 3.3–5)
ALP SERPL-CCNC: 96 U/L — SIGNIFICANT CHANGE UP (ref 40–120)
ALT FLD-CCNC: 52 U/L — HIGH (ref 4–33)
ANION GAP SERPL CALC-SCNC: 11 MMO/L — SIGNIFICANT CHANGE UP (ref 7–14)
AST SERPL-CCNC: 66 U/L — HIGH (ref 4–32)
BASOPHILS # BLD AUTO: 0.02 K/UL — SIGNIFICANT CHANGE UP (ref 0–0.2)
BASOPHILS NFR BLD AUTO: 0.3 % — SIGNIFICANT CHANGE UP (ref 0–2)
BILIRUB SERPL-MCNC: < 0.2 MG/DL — LOW (ref 0.2–1.2)
BUN SERPL-MCNC: 15 MG/DL — SIGNIFICANT CHANGE UP (ref 7–23)
CALCIUM SERPL-MCNC: 8.9 MG/DL — SIGNIFICANT CHANGE UP (ref 8.4–10.5)
CHLORIDE SERPL-SCNC: 100 MMOL/L — SIGNIFICANT CHANGE UP (ref 98–107)
CO2 SERPL-SCNC: 25 MMOL/L — SIGNIFICANT CHANGE UP (ref 22–31)
CREAT SERPL-MCNC: 0.65 MG/DL — SIGNIFICANT CHANGE UP (ref 0.5–1.3)
EOSINOPHIL # BLD AUTO: 0.33 K/UL — SIGNIFICANT CHANGE UP (ref 0–0.5)
EOSINOPHIL NFR BLD AUTO: 4.2 % — SIGNIFICANT CHANGE UP (ref 0–6)
GLUCOSE SERPL-MCNC: 186 MG/DL — HIGH (ref 70–99)
HCT VFR BLD CALC: 36.5 % — SIGNIFICANT CHANGE UP (ref 34.5–45)
HGB BLD-MCNC: 11.4 G/DL — LOW (ref 11.5–15.5)
IMM GRANULOCYTES NFR BLD AUTO: 0.3 % — SIGNIFICANT CHANGE UP (ref 0–1.5)
LYMPHOCYTES # BLD AUTO: 2.86 K/UL — SIGNIFICANT CHANGE UP (ref 1–3.3)
LYMPHOCYTES # BLD AUTO: 36.8 % — SIGNIFICANT CHANGE UP (ref 13–44)
MCHC RBC-ENTMCNC: 25.1 PG — LOW (ref 27–34)
MCHC RBC-ENTMCNC: 31.2 % — LOW (ref 32–36)
MCV RBC AUTO: 80.4 FL — SIGNIFICANT CHANGE UP (ref 80–100)
MONOCYTES # BLD AUTO: 0.71 K/UL — SIGNIFICANT CHANGE UP (ref 0–0.9)
MONOCYTES NFR BLD AUTO: 9.1 % — SIGNIFICANT CHANGE UP (ref 2–14)
NEUTROPHILS # BLD AUTO: 3.83 K/UL — SIGNIFICANT CHANGE UP (ref 1.8–7.4)
NEUTROPHILS NFR BLD AUTO: 49.3 % — SIGNIFICANT CHANGE UP (ref 43–77)
NRBC # FLD: 0.02 K/UL — LOW (ref 25–125)
PLATELET # BLD AUTO: 220 K/UL — SIGNIFICANT CHANGE UP (ref 150–400)
PMV BLD: 11.4 FL — SIGNIFICANT CHANGE UP (ref 7–13)
POTASSIUM SERPL-MCNC: 3.8 MMOL/L — SIGNIFICANT CHANGE UP (ref 3.5–5.3)
POTASSIUM SERPL-SCNC: 3.8 MMOL/L — SIGNIFICANT CHANGE UP (ref 3.5–5.3)
PROT SERPL-MCNC: 7.9 G/DL — SIGNIFICANT CHANGE UP (ref 6–8.3)
RBC # BLD: 4.54 M/UL — SIGNIFICANT CHANGE UP (ref 3.8–5.2)
RBC # FLD: 15.3 % — HIGH (ref 10.3–14.5)
SODIUM SERPL-SCNC: 136 MMOL/L — SIGNIFICANT CHANGE UP (ref 135–145)
WBC # BLD: 7.77 K/UL — SIGNIFICANT CHANGE UP (ref 3.8–10.5)
WBC # FLD AUTO: 7.77 K/UL — SIGNIFICANT CHANGE UP (ref 3.8–10.5)

## 2019-03-21 PROCEDURE — 71046 X-RAY EXAM CHEST 2 VIEWS: CPT | Mod: 26

## 2019-03-21 PROCEDURE — 99283 EMERGENCY DEPT VISIT LOW MDM: CPT

## 2019-03-21 RX ORDER — SODIUM CHLORIDE 9 MG/ML
1000 INJECTION INTRAMUSCULAR; INTRAVENOUS; SUBCUTANEOUS ONCE
Qty: 0 | Refills: 0 | Status: DISCONTINUED | OUTPATIENT
Start: 2019-03-21 | End: 2019-03-21

## 2019-03-21 RX ORDER — PROMETHAZINE HCL/CODEINE
5 SYRUP ORAL
Qty: 60 | Refills: 0
Start: 2019-03-21 | End: 2019-03-23

## 2019-03-21 RX ADMIN — Medication 200 MILLIGRAM(S): at 12:20

## 2019-03-21 RX ADMIN — Medication 50 MILLIGRAM(S): at 12:20

## 2019-03-21 RX ADMIN — Medication 25 MILLIGRAM(S): at 12:46

## 2019-03-21 NOTE — ED PROVIDER NOTE - CARE PLAN
Principal Discharge DX:	Cough  Assessment and plan of treatment:	Advance activity as tolerated.  Continue all previously prescribed medications as directed unless otherwise instructed.  Follow up with your primary care physician in 48-72 hours- bring copies of your results.  Return to the ER for worsening or persistent symptoms, and/or ANY NEW OR CONCERNING SYMPTOMS. If you have issues obtaining follow up, please call: [phone] to obtain a doctor or specialist who takes your insurance in your area.  You may call [phone] to make an appointment with the internal medicine clinic.

## 2019-03-21 NOTE — ED ADULT TRIAGE NOTE - PAIN RATING/NUMBER SCALE (0-10): REST
Take Naproxen 1 tab, twice daily, until all tabs are gone. Space doses 12 hours apart for best effect. TAKE WITH FOOD. Do not take any Advil, Motrin, Aleve or ibuprofen products while taking this medication.
0

## 2019-03-21 NOTE — ED PROVIDER NOTE - PLAN OF CARE
Advance activity as tolerated.  Continue all previously prescribed medications as directed unless otherwise instructed.  Follow up with your primary care physician in 48-72 hours- bring copies of your results.  Return to the ER for worsening or persistent symptoms, and/or ANY NEW OR CONCERNING SYMPTOMS. If you have issues obtaining follow up, please call: [phone] to obtain a doctor or specialist who takes your insurance in your area.  You may call [phone] to make an appointment with the internal medicine clinic.

## 2019-03-21 NOTE — ED PROVIDER NOTE - OBJECTIVE STATEMENT
44yo F, Latvian speaking only, prefers  to translate, hx of DM Type 2, on Metformin, presents to the ED c/o cough x 1 week. Pt endorses non-productive, persistent coughing, worse at night. Pt reports MSK pain secondary to coughing which is frustrating her. Denies ACE inhibitor use, fevers, chills, palpitations, chest pain, sob, hemoptysis, n/v/d, abd pain, dysuria. Pt has not tried any OTC meds for her symptoms.

## 2019-03-21 NOTE — ED ADULT TRIAGE NOTE - INTERPRETER NAME
Lauryn Betina dropped off FMLA. she needs needs completed for herself for intermittent leave. she needs this completed by asap. Please fax/call/mail to fax to employer, call patient when done. Paperwork was given to Leighann's clinical folder.
Eileen

## 2019-04-09 ENCOUNTER — OUTPATIENT (OUTPATIENT)
Dept: OUTPATIENT SERVICES | Facility: HOSPITAL | Age: 44
LOS: 1 days | End: 2019-04-09
Payer: SELF-PAY

## 2019-04-09 ENCOUNTER — APPOINTMENT (OUTPATIENT)
Dept: INTERNAL MEDICINE | Facility: CLINIC | Age: 44
End: 2019-04-09

## 2019-04-09 VITALS
SYSTOLIC BLOOD PRESSURE: 130 MMHG | BODY MASS INDEX: 34.95 KG/M2 | HEIGHT: 60 IN | DIASTOLIC BLOOD PRESSURE: 70 MMHG | WEIGHT: 178 LBS

## 2019-04-09 DIAGNOSIS — I10 ESSENTIAL (PRIMARY) HYPERTENSION: ICD-10-CM

## 2019-04-09 DIAGNOSIS — Z98.51 TUBAL LIGATION STATUS: Chronic | ICD-10-CM

## 2019-04-09 DIAGNOSIS — Z90.49 ACQUIRED ABSENCE OF OTHER SPECIFIED PARTS OF DIGESTIVE TRACT: Chronic | ICD-10-CM

## 2019-04-09 LAB — HBA1C MFR BLD HPLC: 8.5

## 2019-04-09 PROCEDURE — G0463: CPT

## 2019-04-09 RX ORDER — METFORMIN HYDROCHLORIDE 500 MG/1
500 TABLET, COATED ORAL TWICE DAILY
Qty: 90 | Refills: 3 | Status: COMPLETED | COMMUNITY
Start: 2018-05-24 | End: 2019-04-09

## 2019-04-09 RX ORDER — PHENTERMINE HYDROCHLORIDE 37.5 MG/1
37.5 TABLET ORAL DAILY
Qty: 30 | Refills: 0 | Status: COMPLETED | COMMUNITY
Start: 2018-12-28 | End: 2019-04-09

## 2019-04-10 NOTE — PHYSICAL EXAM
[No Acute Distress] : no acute distress [Well Nourished] : well nourished [Well Developed] : well developed [Normal Sclera/Conjunctiva] : normal sclera/conjunctiva [EOMI] : extraocular movements intact [Normal Oropharynx] : the oropharynx was normal [Normal Outer Ear/Nose] : the outer ears and nose were normal in appearance [No JVD] : no jugular venous distention [Supple] : supple [No Respiratory Distress] : no respiratory distress  [No Lymphadenopathy] : no lymphadenopathy [Clear to Auscultation] : lungs were clear to auscultation bilaterally [No Accessory Muscle Use] : no accessory muscle use [Regular Rhythm] : with a regular rhythm [Normal Rate] : normal rate  [No Murmur] : no murmur heard [Normal S1, S2] : normal S1 and S2 [Soft] : abdomen soft [No Edema] : there was no peripheral edema [Non Tender] : non-tender [Non-distended] : non-distended [Normal Bowel Sounds] : normal bowel sounds [No CVA Tenderness] : no CVA  tenderness [No Spinal Tenderness] : no spinal tenderness [No Rash] : no rash [Coordination Grossly Intact] : coordination grossly intact [Normal Gait] : normal gait [No Focal Deficits] : no focal deficits

## 2019-04-16 NOTE — HISTORY OF PRESENT ILLNESS
[de-identified] : 44 yo female with PMHx DM and anemia, R pyelonephritis, Renal Complex cyst, morbid obesity, chronic joint pain  who presents follow up. \par She has shoulder pain three weeks ago, and at that time started feeling dry cough and visited [facility] ED and discharged on a few days of prednisone, phenergan and tessalon.States she occasionally has the cough, it worst at night and is not taking any medication for it. She also states she had an episode of chest pain about 2 weeks ago while walking, pressure like 7/10 intensity, non radiating. Denied palpations, dizziness, sob. Last about 3 hours. With no recurrence.  She ran out of all medications. She also endorses a few weeks ago she had a metal taste in her mouth and complains of mild fatigue. Patient also complains of left sides back pain, stabbing. Usually last 10-15 minutes and resolves. She has not required medications. \par

## 2019-04-16 NOTE — HEALTH RISK ASSESSMENT
[Intercurrent ED visits] : went to ED [No falls in past year] : Patient reported no falls in the past year [1] : 1) Little interest or pleasure doing things for several days (1) [0] : 2) Feeling down, depressed, or hopeless: Not at all (0) [] : No [[id number]] : 1

## 2019-04-16 NOTE — REVIEW OF SYSTEMS
[Chest Pain] : chest pain [Cough] : cough [Muscle Pain] : muscle pain [Back Pain] : back pain [Fever] : no fever [Fatigue] : no fatigue [Chills] : no chills [Vision Problems] : no vision problems [Palpitations] : no palpitations [Hearing Loss] : no hearing loss [Lower Ext Edema] : no lower extremity edema [Orthopnea] : no orthopnea [Shortness Of Breath] : no shortness of breath [Wheezing] : no wheezing [Abdominal Pain] : no abdominal pain [Nausea] : no nausea [Dyspnea on Exertion] : no dyspnea on exertion [Dysuria] : no dysuria [Hematuria] : no hematuria [Frequency] : no frequency [Muscle Weakness] : no muscle weakness [Joint Pain] : no joint pain

## 2019-04-16 NOTE — ASSESSMENT
[FreeTextEntry1] : 44 yo female with PMHx DM, anemia, R pyelonephritis, Left renal Complex cyst who presents for follow up. \par \par #Chest pain\par - Atypical chest pain\par - EKG showed no ST changes. \par - Likely MSK pain from coughing. \par \par #Uncontrolled T2DM \par - POCT HgbA1c 8.5 \par - c/w Metformin 1000mg twice daily. \par -  Also added Glimiperide 1mg daily \par - Patient with uncontrolled DM, obesity. Will start moderate intensity statin, Atorvastatin 20mg daily for prevention.  \par - Nutritionist referral provided. \par - Monitor for episodes of hypoglycemia. \par \par #MorbidObesity \par - Unsuccessful trial on phetermine. \par - Encouraged healthy lifestyle modifications. \par - Nutritionist referral \par \par #BreastMass \par - Repeat mammography in 6 months from dec/2018. \par \par #Anemia \par - Improved, Hgb 11 on 3/21. \par - C/w Ferrous sulfate and multivitamin\par - Miralax daily to avoid constipation. \par \par #Complex renal cyst \par - US showed Left kidney complex cyst, ordered  6 months contrast enhanced MRI in this visit.  \par \par #Cough \par - Tessalon perele prn \par - No wheezing, GERD, post nasal drip. \par \par #HCM\par - Flu vaccine UTD. \par - Mammogram and us in june.\par \par Discussed with Dr. Jarvis \par \par Jarred Arreguin PGY-2 \par \par RTC for CPE in 2.5 months.

## 2019-04-17 DIAGNOSIS — R07.9 CHEST PAIN, UNSPECIFIED: ICD-10-CM

## 2019-04-17 DIAGNOSIS — R05 COUGH: ICD-10-CM

## 2019-04-17 DIAGNOSIS — E11.9 TYPE 2 DIABETES MELLITUS WITHOUT COMPLICATIONS: ICD-10-CM

## 2019-04-17 DIAGNOSIS — N28.1 CYST OF KIDNEY, ACQUIRED: ICD-10-CM

## 2019-05-06 ENCOUNTER — FORM ENCOUNTER (OUTPATIENT)
Age: 44
End: 2019-05-06

## 2019-05-07 ENCOUNTER — OUTPATIENT (OUTPATIENT)
Dept: OUTPATIENT SERVICES | Facility: HOSPITAL | Age: 44
LOS: 1 days | End: 2019-05-07
Payer: SELF-PAY

## 2019-05-07 ENCOUNTER — APPOINTMENT (OUTPATIENT)
Dept: MRI IMAGING | Facility: CLINIC | Age: 44
End: 2019-05-07
Payer: COMMERCIAL

## 2019-05-07 DIAGNOSIS — N28.1 CYST OF KIDNEY, ACQUIRED: ICD-10-CM

## 2019-05-07 DIAGNOSIS — Z90.49 ACQUIRED ABSENCE OF OTHER SPECIFIED PARTS OF DIGESTIVE TRACT: Chronic | ICD-10-CM

## 2019-05-07 DIAGNOSIS — R07.9 CHEST PAIN, UNSPECIFIED: ICD-10-CM

## 2019-05-07 DIAGNOSIS — Z98.51 TUBAL LIGATION STATUS: Chronic | ICD-10-CM

## 2019-05-07 DIAGNOSIS — R05 COUGH: ICD-10-CM

## 2019-05-07 DIAGNOSIS — E11.9 TYPE 2 DIABETES MELLITUS WITHOUT COMPLICATIONS: ICD-10-CM

## 2019-05-07 PROCEDURE — 74183 MRI ABD W/O CNTR FLWD CNTR: CPT

## 2019-05-07 PROCEDURE — A9585: CPT

## 2019-05-07 PROCEDURE — 74183 MRI ABD W/O CNTR FLWD CNTR: CPT | Mod: 26

## 2019-06-21 ENCOUNTER — APPOINTMENT (OUTPATIENT)
Dept: INTERNAL MEDICINE | Facility: CLINIC | Age: 44
End: 2019-06-21

## 2019-06-21 ENCOUNTER — OUTPATIENT (OUTPATIENT)
Dept: OUTPATIENT SERVICES | Facility: HOSPITAL | Age: 44
LOS: 1 days | End: 2019-06-21
Payer: SELF-PAY

## 2019-06-21 VITALS
SYSTOLIC BLOOD PRESSURE: 120 MMHG | DIASTOLIC BLOOD PRESSURE: 80 MMHG | BODY MASS INDEX: 36.12 KG/M2 | HEIGHT: 60 IN | OXYGEN SATURATION: 99 % | WEIGHT: 184 LBS | HEART RATE: 69 BPM

## 2019-06-21 DIAGNOSIS — E11.9 TYPE 2 DIABETES MELLITUS WITHOUT COMPLICATIONS: ICD-10-CM

## 2019-06-21 DIAGNOSIS — Z98.51 TUBAL LIGATION STATUS: Chronic | ICD-10-CM

## 2019-06-21 DIAGNOSIS — R05 COUGH: ICD-10-CM

## 2019-06-21 DIAGNOSIS — Z90.49 ACQUIRED ABSENCE OF OTHER SPECIFIED PARTS OF DIGESTIVE TRACT: Chronic | ICD-10-CM

## 2019-06-21 DIAGNOSIS — I10 ESSENTIAL (PRIMARY) HYPERTENSION: ICD-10-CM

## 2019-06-21 DIAGNOSIS — N28.1 CYST OF KIDNEY, ACQUIRED: ICD-10-CM

## 2019-06-21 DIAGNOSIS — R07.9 CHEST PAIN, UNSPECIFIED: ICD-10-CM

## 2019-06-21 PROCEDURE — G0463: CPT

## 2019-06-21 RX ORDER — BENZONATATE 100 MG/1
100 CAPSULE ORAL 3 TIMES DAILY
Qty: 30 | Refills: 5 | Status: COMPLETED | COMMUNITY
Start: 2019-04-09 | End: 2019-06-21

## 2019-06-21 NOTE — PHYSICAL EXAM
[No Acute Distress] : no acute distress [Well Nourished] : well nourished [Well Developed] : well developed [Normal Sclera/Conjunctiva] : normal sclera/conjunctiva [EOMI] : extraocular movements intact [Normal Outer Ear/Nose] : the outer ears and nose were normal in appearance [Normal Oropharynx] : the oropharynx was normal [No JVD] : no jugular venous distention [No Lymphadenopathy] : no lymphadenopathy [Supple] : supple [No Respiratory Distress] : no respiratory distress  [Clear to Auscultation] : lungs were clear to auscultation bilaterally [Normal Rate] : normal rate  [Regular Rhythm] : with a regular rhythm [Normal S1, S2] : normal S1 and S2 [No Murmur] : no murmur heard [No Edema] : there was no peripheral edema [No Extremity Clubbing/Cyanosis] : no extremity clubbing/cyanosis [Soft] : abdomen soft [Non Tender] : non-tender [Normal Bowel Sounds] : normal bowel sounds [No CVA Tenderness] : no CVA  tenderness [No Spinal Tenderness] : no spinal tenderness [No Rash] : no rash [No Focal Deficits] : no focal deficits [Normal Gait] : normal gait

## 2019-06-23 NOTE — HISTORY OF PRESENT ILLNESS
[FreeTextEntry1] : Follow up  [de-identified] : 43 yo female with PMHx T2DM and Microcytic anemia, hx R pyelonephritis, L Renal Complex cyst (Bosniak 2F, 2019), morbid obesity, chronic joint pain who presents follow up. Sometimes feels mild chronic left back pain, does not last long and she does not take medications for it. She had the abd MRI for the renal cyst, no malignant findings, follow up imaging in 6 months. She is concerned about herself having attention deficit disorder given that her son was diagnosed with it. She endorses that sometimes is in a conversation and she forgets what she is talking.  She is eating healthy and is gaining weight. \par \par

## 2019-06-23 NOTE — ASSESSMENT
[FreeTextEntry1] : 43 yo female with PMHx T2DM and Microcytic anemia, hx R pyelonephritis, L Renal Complex cyst (Bosniak 2F, 2019), morbid obesity, chronic joint pain who presents follow up\par \par #Uncontrolled T2DM \par - POCT HgbA1c 8.5 \par - c/w Metformin 1000mg twice daily. \par - C/w Glimiperide 1mg daily \par - C/w Atorvastatin 20mg daily. ASCVD score 1 \par - Nutritionist referral and diabetes educator referral provided. \par - Hgba1c ordered for after july 10th. \par \par #MorbidObesity \par - Encouraged healthy lifestyle modifications. \par - Nutritionist referral \par \par #BreastMass \par - Mammography ordered. \par \par #Anemia \par - Improved, Hgb 11 on 3/21. \par - C/w Ferrous sulfate and multivitamin\par - Miralax daily to avoid constipation. \par - Repeat cbc after jul 10. \par \par #Complex renal cyst \par - MRI showed Left kidney complex cyst. \par - Bosniak 2F less than 5% change of malignancy. Repeat in 6 months. \par \par #HCM\par - vaccine UTD. \par - Mammography referral griven. \par \par Discussed with Dr. Vasquez \par \par Jarred Arreguin PGY-2 \par \par CPE in oct/nov 2019.

## 2019-06-23 NOTE — REVIEW OF SYSTEMS
[Fever] : no fever [Chills] : no chills [Fatigue] : no fatigue [Sore Throat] : no sore throat [Vision Problems] : no vision problems [Chest Pain] : no chest pain [Palpitations] : no palpitations [Lower Ext Edema] : no lower extremity edema [Shortness Of Breath] : no shortness of breath [Wheezing] : no wheezing [Cough] : no cough [Dyspnea on Exertion] : no dyspnea on exertion [Abdominal Pain] : no abdominal pain [Diarrhea] : diarrhea [Dysuria] : no dysuria [Hematuria] : no hematuria [Skin Rash] : no skin rash [Headache] : no headache [Dizziness] : no dizziness [Fainting] : no fainting

## 2019-06-23 NOTE — HEALTH RISK ASSESSMENT
[No] : In the past 12 months have you used drugs other than those required for medical reasons? No [No falls in past year] : Patient reported no falls in the past year [0] : 2) Feeling down, depressed, or hopeless: Not at all (0) [] : No [[id number]] : 0

## 2019-06-28 ENCOUNTER — APPOINTMENT (OUTPATIENT)
Dept: INTERNAL MEDICINE | Facility: CLINIC | Age: 44
End: 2019-06-28

## 2019-06-28 ENCOUNTER — OUTPATIENT (OUTPATIENT)
Dept: OUTPATIENT SERVICES | Facility: HOSPITAL | Age: 44
LOS: 1 days | End: 2019-06-28
Payer: SELF-PAY

## 2019-06-28 DIAGNOSIS — I10 ESSENTIAL (PRIMARY) HYPERTENSION: ICD-10-CM

## 2019-06-28 DIAGNOSIS — Z98.51 TUBAL LIGATION STATUS: Chronic | ICD-10-CM

## 2019-06-28 DIAGNOSIS — Z90.49 ACQUIRED ABSENCE OF OTHER SPECIFIED PARTS OF DIGESTIVE TRACT: Chronic | ICD-10-CM

## 2019-06-28 PROCEDURE — 97802 MEDICAL NUTRITION INDIV IN: CPT

## 2019-07-03 DIAGNOSIS — E66.01 MORBID (SEVERE) OBESITY DUE TO EXCESS CALORIES: ICD-10-CM

## 2019-07-03 DIAGNOSIS — D64.9 ANEMIA, UNSPECIFIED: ICD-10-CM

## 2019-07-11 DIAGNOSIS — E11.9 TYPE 2 DIABETES MELLITUS WITHOUT COMPLICATIONS: ICD-10-CM

## 2019-08-23 ENCOUNTER — APPOINTMENT (OUTPATIENT)
Dept: INTERNAL MEDICINE | Facility: CLINIC | Age: 44
End: 2019-08-23

## 2019-09-26 ENCOUNTER — FORM ENCOUNTER (OUTPATIENT)
Age: 44
End: 2019-09-26

## 2019-09-27 ENCOUNTER — OUTPATIENT (OUTPATIENT)
Dept: OUTPATIENT SERVICES | Facility: HOSPITAL | Age: 44
LOS: 1 days | End: 2019-09-27

## 2019-09-27 ENCOUNTER — APPOINTMENT (OUTPATIENT)
Dept: MAMMOGRAPHY | Facility: IMAGING CENTER | Age: 44
End: 2019-09-27
Payer: COMMERCIAL

## 2019-09-27 ENCOUNTER — APPOINTMENT (OUTPATIENT)
Dept: ULTRASOUND IMAGING | Facility: IMAGING CENTER | Age: 44
End: 2019-09-27
Payer: COMMERCIAL

## 2019-09-27 DIAGNOSIS — E66.01 MORBID (SEVERE) OBESITY DUE TO EXCESS CALORIES: ICD-10-CM

## 2019-09-27 DIAGNOSIS — D64.9 ANEMIA, UNSPECIFIED: ICD-10-CM

## 2019-09-27 DIAGNOSIS — Z00.00 ENCOUNTER FOR GENERAL ADULT MEDICAL EXAMINATION WITHOUT ABNORMAL FINDINGS: ICD-10-CM

## 2019-09-27 DIAGNOSIS — Z98.51 TUBAL LIGATION STATUS: Chronic | ICD-10-CM

## 2019-09-27 DIAGNOSIS — E11.9 TYPE 2 DIABETES MELLITUS WITHOUT COMPLICATIONS: ICD-10-CM

## 2019-09-27 DIAGNOSIS — Z90.49 ACQUIRED ABSENCE OF OTHER SPECIFIED PARTS OF DIGESTIVE TRACT: Chronic | ICD-10-CM

## 2019-09-27 PROCEDURE — 77066 DX MAMMO INCL CAD BI: CPT | Mod: 26

## 2019-09-27 PROCEDURE — 76642 ULTRASOUND BREAST LIMITED: CPT | Mod: 26,50

## 2019-09-27 PROCEDURE — G0279: CPT | Mod: 26

## 2019-10-09 ENCOUNTER — LABORATORY RESULT (OUTPATIENT)
Age: 44
End: 2019-10-09

## 2019-10-22 ENCOUNTER — APPOINTMENT (OUTPATIENT)
Dept: OBGYN | Facility: CLINIC | Age: 44
End: 2019-10-22

## 2019-11-06 ENCOUNTER — APPOINTMENT (OUTPATIENT)
Dept: OBGYN | Facility: CLINIC | Age: 44
End: 2019-11-06

## 2019-12-11 ENCOUNTER — LABORATORY RESULT (OUTPATIENT)
Age: 44
End: 2019-12-11

## 2019-12-11 ENCOUNTER — OUTPATIENT (OUTPATIENT)
Dept: OUTPATIENT SERVICES | Facility: HOSPITAL | Age: 44
LOS: 1 days | End: 2019-12-11
Payer: SELF-PAY

## 2019-12-11 ENCOUNTER — APPOINTMENT (OUTPATIENT)
Dept: INTERNAL MEDICINE | Facility: CLINIC | Age: 44
End: 2019-12-11

## 2019-12-11 VITALS
SYSTOLIC BLOOD PRESSURE: 140 MMHG | WEIGHT: 182 LBS | DIASTOLIC BLOOD PRESSURE: 78 MMHG | HEIGHT: 60 IN | BODY MASS INDEX: 35.73 KG/M2

## 2019-12-11 DIAGNOSIS — Z98.51 TUBAL LIGATION STATUS: Chronic | ICD-10-CM

## 2019-12-11 DIAGNOSIS — Z90.49 ACQUIRED ABSENCE OF OTHER SPECIFIED PARTS OF DIGESTIVE TRACT: Chronic | ICD-10-CM

## 2019-12-11 DIAGNOSIS — I10 ESSENTIAL (PRIMARY) HYPERTENSION: ICD-10-CM

## 2019-12-11 LAB
ESTIMATED AVERAGE GLUCOSE: 226 MG/DL — HIGH (ref 68–114)
HBA1C BLD-MCNC: 9.5 % — HIGH (ref 4–5.6)
HCT VFR BLD CALC: 38.2 % — SIGNIFICANT CHANGE UP (ref 34.5–45)
HGB BLD-MCNC: 11.3 G/DL — LOW (ref 11.5–15.5)
MCHC RBC-ENTMCNC: 24.6 PG — LOW (ref 27–34)
MCHC RBC-ENTMCNC: 29.6 GM/DL — LOW (ref 32–36)
MCV RBC AUTO: 83.2 FL — SIGNIFICANT CHANGE UP (ref 80–100)
PLATELET # BLD AUTO: 215 K/UL — SIGNIFICANT CHANGE UP (ref 150–400)
RBC # BLD: 4.59 M/UL — SIGNIFICANT CHANGE UP (ref 3.8–5.2)
RBC # FLD: 15.3 % — HIGH (ref 10.3–14.5)
WBC # BLD: 6.34 K/UL — SIGNIFICANT CHANGE UP (ref 3.8–10.5)
WBC # FLD AUTO: 6.34 K/UL — SIGNIFICANT CHANGE UP (ref 3.8–10.5)

## 2019-12-11 PROCEDURE — 80061 LIPID PANEL: CPT

## 2019-12-11 PROCEDURE — 80053 COMPREHEN METABOLIC PANEL: CPT

## 2019-12-11 PROCEDURE — G0463: CPT

## 2019-12-11 PROCEDURE — 83036 HEMOGLOBIN GLYCOSYLATED A1C: CPT

## 2019-12-11 PROCEDURE — 85027 COMPLETE CBC AUTOMATED: CPT

## 2019-12-11 RX ORDER — CHLORHEXIDINE GLUCONATE 4 %
325 (65 FE) LIQUID (ML) TOPICAL DAILY
Qty: 30 | Refills: 5 | Status: COMPLETED | COMMUNITY
Start: 2018-07-23 | End: 2019-12-11

## 2019-12-11 RX ORDER — LORATADINE 10 MG
17 TABLET,DISINTEGRATING ORAL
Qty: 1 | Refills: 5 | Status: COMPLETED | COMMUNITY
Start: 2018-07-23 | End: 2019-12-11

## 2019-12-12 LAB
ALBUMIN SERPL ELPH-MCNC: 4.4 G/DL — SIGNIFICANT CHANGE UP (ref 3.3–5)
ALP SERPL-CCNC: 135 U/L — HIGH (ref 40–120)
ALT FLD-CCNC: 83 U/L — HIGH (ref 10–45)
ANION GAP SERPL CALC-SCNC: 15 MMOL/L — SIGNIFICANT CHANGE UP (ref 5–17)
AST SERPL-CCNC: 89 U/L — HIGH (ref 10–40)
BILIRUB SERPL-MCNC: 0.2 MG/DL — SIGNIFICANT CHANGE UP (ref 0.2–1.2)
BUN SERPL-MCNC: 18 MG/DL — SIGNIFICANT CHANGE UP (ref 7–23)
CALCIUM SERPL-MCNC: 9.2 MG/DL — SIGNIFICANT CHANGE UP (ref 8.4–10.5)
CHLORIDE SERPL-SCNC: 98 MMOL/L — SIGNIFICANT CHANGE UP (ref 96–108)
CHOLEST SERPL-MCNC: 170 MG/DL — SIGNIFICANT CHANGE UP (ref 10–199)
CO2 SERPL-SCNC: 24 MMOL/L — SIGNIFICANT CHANGE UP (ref 22–31)
CREAT SERPL-MCNC: 0.63 MG/DL — SIGNIFICANT CHANGE UP (ref 0.5–1.3)
GLUCOSE SERPL-MCNC: 263 MG/DL — HIGH (ref 70–99)
HDLC SERPL-MCNC: 46 MG/DL — LOW
LIPID PNL WITH DIRECT LDL SERPL: 86 MG/DL — SIGNIFICANT CHANGE UP
POTASSIUM SERPL-MCNC: 5 MMOL/L — SIGNIFICANT CHANGE UP (ref 3.5–5.3)
POTASSIUM SERPL-SCNC: 5 MMOL/L — SIGNIFICANT CHANGE UP (ref 3.5–5.3)
PROT SERPL-MCNC: 7.5 G/DL — SIGNIFICANT CHANGE UP (ref 6–8.3)
SODIUM SERPL-SCNC: 137 MMOL/L — SIGNIFICANT CHANGE UP (ref 135–145)
TOTAL CHOLESTEROL/HDL RATIO MEASUREMENT: 3.7 RATIO — SIGNIFICANT CHANGE UP (ref 3.3–7.1)
TRIGL SERPL-MCNC: 190 MG/DL — HIGH (ref 10–149)

## 2019-12-12 NOTE — HISTORY OF PRESENT ILLNESS
[FreeTextEntry1] : Follow up  [de-identified] : 43 yo female with PMHx T2DM and Microcytic anemia(resolved), hx R pyelonephritis, L Renal Complex cyst (Bosniak 2F, 2019), morbid obesity, chronic joint pain who presents follow up. She endorses being in good health. She denied any fever, chills, sob, nausea, vomiting, diarrhea, burning on urination, chest pain and abdominal pain.. She has been taking her diabetes medications. Has not have been following a strict diet or an exercise plan. She feels a small nodule in her R lumbar back. Which is non-tender, non-mobile. She also endorses bilaterally knee pain, chronic, No knee budging or new symptoms.

## 2019-12-12 NOTE — REVIEW OF SYSTEMS
[Fever] : no fever [Chills] : no chills [Fatigue] : no fatigue [Vision Problems] : no vision problems [Chest Pain] : no chest pain [Sore Throat] : no sore throat [Lower Ext Edema] : no lower extremity edema [Shortness Of Breath] : no shortness of breath [Palpitations] : no palpitations [Wheezing] : no wheezing [Cough] : no cough [Dyspnea on Exertion] : no dyspnea on exertion [Diarrhea] : diarrhea [Abdominal Pain] : no abdominal pain [Hematuria] : no hematuria [Dysuria] : no dysuria [Back Pain] : no back pain [Joint Pain] : no joint pain [Headache] : no headache [Skin Rash] : no skin rash [Fainting] : no fainting [Dizziness] : no dizziness

## 2019-12-12 NOTE — ASSESSMENT
[FreeTextEntry1] : 43 yo female with PMHx T2DM and Microcytic anemia, hx R pyelonephritis, L Renal Complex cyst (Bosniak 2F, 2019), morbid obesity, chronic joint pain who presents follow up. \par \par #Uncontrolled T2DM \par - Last HgbA1c 7.6 in october, will repeat for medication adjustment. Patient has poor follow up. \par - c/w Metformin 1000mg twice daily. \par - C/w Glimiperide 1mg daily \par - C/w Atorvastatin 20mg daily. ASCVD score elevated. \par - Diabetic foot exam wnl in this visit. \par - Poor opthalmology follow up. \par - Microalbumin in next appt. \par \par #Elevated blood presure \par - SBP today in 140s, will recheck in next appointment and consider starting on ACE. \par \par #Complex renal cyst \par - MRI showed Left kidney complex cyst. \par - Bosniak 2F less than 5% change of malignancy.\par - Repeat MRI ordered\par - Urology referral for monitoring and recommendations. \par \par #MorbidObesity \par - Encouraged healthy lifestyle modifications. \par - Nutritionist recs appreciated. \par - Obestiy clinic referral for counseling. \par \par #Anemia \par - H/H improved. \par - C/w multivitamin \par - Ferrous sulfate DC 2/2 constipation. \par \par #HCM\par - Full set of labs ordered. \par - vaccine UTD. \par - Mammography BIRAD 3, repeat next year. \par \par Discussed with Dr. Edward \par \par Jarred Arreguin PGY-3\par \par Next appointment CPE.

## 2019-12-12 NOTE — PHYSICAL EXAM
[Well Nourished] : well nourished [No Acute Distress] : no acute distress [Well Developed] : well developed [EOMI] : extraocular movements intact [Normal Sclera/Conjunctiva] : normal sclera/conjunctiva [Normal Outer Ear/Nose] : the outer ears and nose were normal in appearance [Normal Oropharynx] : the oropharynx was normal [No Respiratory Distress] : no respiratory distress  [Normal Rate] : normal rate  [Clear to Auscultation] : lungs were clear to auscultation bilaterally [Regular Rhythm] : with a regular rhythm [Normal S1, S2] : normal S1 and S2 [No Murmur] : no murmur heard [Soft] : abdomen soft [Non Tender] : non-tender [No CVA Tenderness] : no CVA  tenderness [Normal Bowel Sounds] : normal bowel sounds [No Rash] : no rash [Normal Gait] : normal gait [No Focal Deficits] : no focal deficits [Comprehensive Foot Exam Normal] : Right and left foot were examined and both feet are normal. No ulcers in either foot. Toes are normal and with full ROM.  Normal tactile sensation with monofilament testing throughout both feet [de-identified] : Paraspinal tenderness on the L lumbar area.

## 2019-12-17 DIAGNOSIS — E11.9 TYPE 2 DIABETES MELLITUS WITHOUT COMPLICATIONS: ICD-10-CM

## 2019-12-17 DIAGNOSIS — D64.9 ANEMIA, UNSPECIFIED: ICD-10-CM

## 2019-12-17 DIAGNOSIS — E66.01 MORBID (SEVERE) OBESITY DUE TO EXCESS CALORIES: ICD-10-CM

## 2020-01-14 LAB
ALBUMIN SERPL ELPH-MCNC: 4.3 G/DL
ALP BLD-CCNC: 105 U/L
ALT SERPL-CCNC: 46 U/L
ANION GAP SERPL CALC-SCNC: 14 MMOL/L
AST SERPL-CCNC: 53 U/L
BILIRUB SERPL-MCNC: 0.2 MG/DL
BUN SERPL-MCNC: 13 MG/DL
CALCIUM SERPL-MCNC: 9.5 MG/DL
CHLORIDE SERPL-SCNC: 103 MMOL/L
CO2 SERPL-SCNC: 25 MMOL/L
CREAT SERPL-MCNC: 0.69 MG/DL
GLUCOSE SERPL-MCNC: 110 MG/DL
HAV IGM SER QL: NONREACTIVE
HBV CORE IGG+IGM SER QL: NONREACTIVE
HBV CORE IGM SER QL: NONREACTIVE
HBV SURFACE AB SER QL: NONREACTIVE
HBV SURFACE AG SER QL: NONREACTIVE
HCV AB SER QL: NONREACTIVE
HCV S/CO RATIO: 0.2 S/CO
POTASSIUM SERPL-SCNC: 4.5 MMOL/L
PROT SERPL-MCNC: 7.2 G/DL
SODIUM SERPL-SCNC: 142 MMOL/L

## 2020-01-19 ENCOUNTER — FORM ENCOUNTER (OUTPATIENT)
Age: 45
End: 2020-01-19

## 2020-01-20 ENCOUNTER — APPOINTMENT (OUTPATIENT)
Dept: MRI IMAGING | Facility: CLINIC | Age: 45
End: 2020-01-20
Payer: COMMERCIAL

## 2020-01-20 ENCOUNTER — OUTPATIENT (OUTPATIENT)
Dept: OUTPATIENT SERVICES | Facility: HOSPITAL | Age: 45
LOS: 1 days | End: 2020-01-20
Payer: SELF-PAY

## 2020-01-20 DIAGNOSIS — E11.9 TYPE 2 DIABETES MELLITUS WITHOUT COMPLICATIONS: ICD-10-CM

## 2020-01-20 DIAGNOSIS — Z98.51 TUBAL LIGATION STATUS: Chronic | ICD-10-CM

## 2020-01-20 DIAGNOSIS — Z90.49 ACQUIRED ABSENCE OF OTHER SPECIFIED PARTS OF DIGESTIVE TRACT: Chronic | ICD-10-CM

## 2020-01-20 DIAGNOSIS — D64.9 ANEMIA, UNSPECIFIED: ICD-10-CM

## 2020-01-20 DIAGNOSIS — E66.01 MORBID (SEVERE) OBESITY DUE TO EXCESS CALORIES: ICD-10-CM

## 2020-01-20 PROCEDURE — 74183 MRI ABD W/O CNTR FLWD CNTR: CPT

## 2020-01-20 PROCEDURE — 74183 MRI ABD W/O CNTR FLWD CNTR: CPT | Mod: 26

## 2020-03-24 ENCOUNTER — APPOINTMENT (OUTPATIENT)
Dept: INTERNAL MEDICINE | Facility: CLINIC | Age: 45
End: 2020-03-24

## 2020-03-25 ENCOUNTER — APPOINTMENT (OUTPATIENT)
Dept: INTERNAL MEDICINE | Facility: CLINIC | Age: 45
End: 2020-03-25

## 2020-05-01 ENCOUNTER — APPOINTMENT (OUTPATIENT)
Dept: INTERNAL MEDICINE | Facility: CLINIC | Age: 45
End: 2020-05-01

## 2020-10-15 ENCOUNTER — OUTPATIENT (OUTPATIENT)
Dept: OUTPATIENT SERVICES | Facility: HOSPITAL | Age: 45
LOS: 1 days | End: 2020-10-15
Payer: SELF-PAY

## 2020-10-15 ENCOUNTER — APPOINTMENT (OUTPATIENT)
Dept: INTERNAL MEDICINE | Facility: CLINIC | Age: 45
End: 2020-10-15

## 2020-10-15 VITALS
BODY MASS INDEX: 35.93 KG/M2 | DIASTOLIC BLOOD PRESSURE: 80 MMHG | WEIGHT: 183 LBS | SYSTOLIC BLOOD PRESSURE: 118 MMHG | HEIGHT: 60 IN

## 2020-10-15 DIAGNOSIS — Z90.49 ACQUIRED ABSENCE OF OTHER SPECIFIED PARTS OF DIGESTIVE TRACT: Chronic | ICD-10-CM

## 2020-10-15 DIAGNOSIS — Z98.51 TUBAL LIGATION STATUS: Chronic | ICD-10-CM

## 2020-10-15 DIAGNOSIS — E11.9 TYPE 2 DIABETES MELLITUS WITHOUT COMPLICATIONS: ICD-10-CM

## 2020-10-15 DIAGNOSIS — I10 ESSENTIAL (PRIMARY) HYPERTENSION: ICD-10-CM

## 2020-10-15 DIAGNOSIS — N28.1 CYST OF KIDNEY, ACQUIRED: ICD-10-CM

## 2020-10-15 DIAGNOSIS — L72.9 FOLLICULAR CYST OF THE SKIN AND SUBCUTANEOUS TISSUE, UNSPECIFIED: ICD-10-CM

## 2020-10-15 PROCEDURE — G0463: CPT

## 2020-10-18 PROBLEM — N28.1 COMPLEX RENAL CYST: Status: RESOLVED | Noted: 2018-09-11 | Resolved: 2020-10-18

## 2020-10-18 RX ORDER — MULTIVITAMIN
TABLET ORAL DAILY
Qty: 1 | Refills: 5 | Status: DISCONTINUED | COMMUNITY
Start: 2018-12-28 | End: 2020-10-18

## 2020-10-19 NOTE — HEALTH RISK ASSESSMENT
[No] : No [0] : 2) Feeling down, depressed, or hopeless: Not at all (0) [1] : 1) Little interest or pleasure doing things for several days (1) [] : No [[id number]] : 1

## 2020-10-19 NOTE — COUNSELING
[Potential consequences of obesity discussed] : Potential consequences of obesity discussed [Benefits of weight loss discussed] : Benefits of weight loss discussed [Encouraged to maintain food diary] : Encouraged to maintain food diary [Encouraged to increase physical activity] : Encouraged to increase physical activity [Keep Food Diary] : keep food diary [Needs reinforcement, provided] : Patient needs reinforcement on understanding of lifestyle changes and steps needed to achieve self management goal; reinforcement was provided [FreeTextEntry2] : 43 yo female with PMHx T2DM and Microcytic anemia(resolved), hx R pyelonephritis, L Renal Complex cyst (Bosniak 2F, 2019), morbid obesity, chronic joint pain who presents follow up. She endorses being in good health. She denied any fever, chills, sob, nausea, vomiting, diarrhea, burning on urination, chest pain and abdominal pain.. She has been taking her diabetes medications. Has not have been following a strict diet or an exercise plan. She feels a small nodule in her R lumbar back. Which is non-tender, non-mobile. She also endorses bilaterally knee pain, chronic, No knee budging or new symptoms. \par Will try to cut soda, walk 30 minutes per day [FreeTextEntry4] : 15 [FreeTextEntry3] : cut soda, walk

## 2020-10-19 NOTE — PHYSICAL EXAM
[No Acute Distress] : no acute distress [Well Nourished] : well nourished [PERRL] : pupils equal round and reactive to light [EOMI] : extraocular movements intact [No Lymphadenopathy] : no lymphadenopathy [Thyroid Normal, No Nodules] : the thyroid was normal and there were no nodules present [Clear to Auscultation] : lungs were clear to auscultation bilaterally [No Murmur] : no murmur heard [Normal S1, S2] : normal S1 and S2 [Declined Breast Exam] : declined breast exam  [Soft] : abdomen soft [Coordination Grossly Intact] : coordination grossly intact [Non Tender] : non-tender [No CVA Tenderness] : no CVA  tenderness [Normal Gait] : normal gait [No Focal Deficits] : no focal deficits [de-identified] : obese [de-identified] : back right ride lumbar area subcutaneous cyst, nonfluctuant [de-identified] : paraspinal tenderness

## 2020-10-19 NOTE — HISTORY OF PRESENT ILLNESS
[de-identified] : 43 yo female with PMHx T2DM and Microcytic anemia(resolved), hx R pyelonephritis, L Renal Complex cyst (Bosniak 2F, 2019), morbid obesity, chronic joint pain who presents for CPE. She endorses being in good health. She denied any fever, chills, sob, nausea, vomiting, diarrhea, burning on urination, chest pain and abdominal pain.. She has been taking her diabetes medications. Has not have been following a strict diet or an exercise plan. She feels a small nodule in her R lumbar back which is non-tender, non-mobile grows and shinks in size occasionally. She also endorses bilateral knee pain, chronic, stable not on meds.\par \par HCM\par Diet/exercise/habits\par 43 yo female with PMHx T2DM and Microcytic anemia(resolved), hx R pyelonephritis, L Renal Complex cyst (Bosniak 2F, 2019), morbid obesity, chronic joint pain who presents follow up. She endorses being in good health. She denied any fever, chills, sob, nausea, vomiting, diarrhea, burning on urination, chest pain and abdominal pain.. She has been taking her diabetes medications. Has not have been following a strict diet or an exercise plan. She feels a small nodule in her R lumbar back. Which is non-tender, non-mobile. She also endorses bilaterally knee pain, chronic, No knee budging or new symptoms. \par - no smoking, drinking, drugs\par \par MRI earlier this year with complex hoang-abdominal cyst which has grown in size.

## 2020-10-19 NOTE — REVIEW OF SYSTEMS
[Back Pain] : back pain [Fever] : no fever [Chills] : no chills [Shortness Of Breath] : no shortness of breath [Vision Problems] : no vision problems [Chest Pain] : no chest pain [Abdominal Pain] : no abdominal pain [Cough] : no cough [Dysuria] : no dysuria [Headache] : no headache

## 2020-11-03 ENCOUNTER — LABORATORY RESULT (OUTPATIENT)
Age: 45
End: 2020-11-03

## 2020-11-04 ENCOUNTER — APPOINTMENT (OUTPATIENT)
Dept: OBGYN | Facility: CLINIC | Age: 45
End: 2020-11-04
Payer: COMMERCIAL

## 2020-11-04 ENCOUNTER — OUTPATIENT (OUTPATIENT)
Dept: OUTPATIENT SERVICES | Facility: HOSPITAL | Age: 45
LOS: 1 days | End: 2020-11-04
Payer: SELF-PAY

## 2020-11-04 VITALS — SYSTOLIC BLOOD PRESSURE: 116 MMHG | BODY MASS INDEX: 35.35 KG/M2 | DIASTOLIC BLOOD PRESSURE: 80 MMHG | WEIGHT: 181 LBS

## 2020-11-04 VITALS — TEMPERATURE: 97.3 F

## 2020-11-04 DIAGNOSIS — Z90.49 ACQUIRED ABSENCE OF OTHER SPECIFIED PARTS OF DIGESTIVE TRACT: Chronic | ICD-10-CM

## 2020-11-04 DIAGNOSIS — Z98.51 TUBAL LIGATION STATUS: Chronic | ICD-10-CM

## 2020-11-04 DIAGNOSIS — Z09 ENCOUNTER FOR FOLLOW-UP EXAMINATION AFTER COMPLETED TREATMENT FOR CONDITIONS OTHER THAN MALIGNANT NEOPLASM: ICD-10-CM

## 2020-11-04 DIAGNOSIS — N76.0 ACUTE VAGINITIS: ICD-10-CM

## 2020-11-04 DIAGNOSIS — Z01.419 ENCOUNTER FOR GYNECOLOGICAL EXAMINATION (GENERAL) (ROUTINE) W/OUT ABNORMAL FINDINGS: ICD-10-CM

## 2020-11-04 DIAGNOSIS — Z87.42 PERSONAL HISTORY OF OTHER DISEASES OF THE FEMALE GENITAL TRACT: ICD-10-CM

## 2020-11-04 PROCEDURE — 87624 HPV HI-RISK TYP POOLED RSLT: CPT

## 2020-11-04 PROCEDURE — G0463: CPT

## 2020-11-04 PROCEDURE — 99396 PREV VISIT EST AGE 40-64: CPT | Mod: NC

## 2020-11-04 NOTE — PHYSICAL EXAM
[Awake] : awake [Alert] : alert [Acute Distress] : no acute distress [Mass] : no breast mass [Nipple Discharge] : no nipple discharge [Axillary LAD] : no axillary lymphadenopathy [Soft] : soft [Tender] : non tender [Oriented x3] : oriented to person, place, and time [Normal] : uterus [Labia Majora] : labia major [Labia Minora] : labia minora [No Bleeding] : there was no active vaginal bleeding [Pap Obtained] : a Pap smear was performed [Motion Tenderness] : there was no cervical motion tenderness [Uterine Adnexae] : were not tender and not enlarged

## 2020-11-04 NOTE — HISTORY OF PRESENT ILLNESS
[___ Year(s) Ago] : [unfilled] year(s) ago [Last Mammogram ___] : Last Mammogram was [unfilled] [Last Pap ___] : Last cervical pap smear was [unfilled] [Reproductive Age] : is of reproductive age [Menstrual Problems] : reports normal menses [Pregnancy History] : pregnancy history: [Definite:  ___ (Date)] : the last menstrual period was [unfilled] [Normal Amount/Duration] : was of a normal amount and duration [Spotting Between  Menses] : no spotting between menses [Regular Cycle Intervals] : periods have been regular [Menstrual Cramps] : no menstrual cramps [Sexually Active] : is sexually active [Monogamous] : is monogamous [Contraception] : does not use contraception

## 2020-11-05 LAB — HPV HIGH+LOW RISK DNA PNL CVX: SIGNIFICANT CHANGE UP

## 2020-11-07 LAB — CYTOLOGY SPEC DOC CYTO: SIGNIFICANT CHANGE UP

## 2020-11-10 DIAGNOSIS — Z01.419 ENCOUNTER FOR GYNECOLOGICAL EXAMINATION (GENERAL) (ROUTINE) WITHOUT ABNORMAL FINDINGS: ICD-10-CM

## 2020-11-10 DIAGNOSIS — N39.3 STRESS INCONTINENCE (FEMALE) (MALE): ICD-10-CM

## 2020-12-08 ENCOUNTER — APPOINTMENT (OUTPATIENT)
Dept: GASTROENTEROLOGY | Facility: HOSPITAL | Age: 45
End: 2020-12-08

## 2020-12-23 PROBLEM — Z01.419 ENCOUNTER FOR GYNECOLOGICAL EXAMINATION: Status: RESOLVED | Noted: 2018-05-10 | Resolved: 2020-12-23

## 2021-01-07 ENCOUNTER — RESULT REVIEW (OUTPATIENT)
Age: 46
End: 2021-01-07

## 2021-01-07 ENCOUNTER — APPOINTMENT (OUTPATIENT)
Dept: ULTRASOUND IMAGING | Facility: IMAGING CENTER | Age: 46
End: 2021-01-07
Payer: COMMERCIAL

## 2021-01-07 ENCOUNTER — APPOINTMENT (OUTPATIENT)
Dept: MAMMOGRAPHY | Facility: IMAGING CENTER | Age: 46
End: 2021-01-07
Payer: COMMERCIAL

## 2021-01-07 ENCOUNTER — OUTPATIENT (OUTPATIENT)
Dept: OUTPATIENT SERVICES | Facility: HOSPITAL | Age: 46
LOS: 1 days | End: 2021-01-07
Payer: SELF-PAY

## 2021-01-07 DIAGNOSIS — Z98.51 TUBAL LIGATION STATUS: Chronic | ICD-10-CM

## 2021-01-07 DIAGNOSIS — N39.3 STRESS INCONTINENCE (FEMALE) (MALE): ICD-10-CM

## 2021-01-07 DIAGNOSIS — Z01.419 ENCOUNTER FOR GYNECOLOGICAL EXAMINATION (GENERAL) (ROUTINE) WITHOUT ABNORMAL FINDINGS: ICD-10-CM

## 2021-01-07 DIAGNOSIS — Z90.49 ACQUIRED ABSENCE OF OTHER SPECIFIED PARTS OF DIGESTIVE TRACT: Chronic | ICD-10-CM

## 2021-01-07 PROCEDURE — 77066 DX MAMMO INCL CAD BI: CPT

## 2021-01-07 PROCEDURE — G0279: CPT

## 2021-01-07 PROCEDURE — G0279: CPT | Mod: 26

## 2021-01-07 PROCEDURE — 77066 DX MAMMO INCL CAD BI: CPT | Mod: 26

## 2021-01-21 ENCOUNTER — NON-APPOINTMENT (OUTPATIENT)
Age: 46
End: 2021-01-21

## 2021-01-22 ENCOUNTER — APPOINTMENT (OUTPATIENT)
Dept: OBGYN | Facility: CLINIC | Age: 46
End: 2021-01-22
Payer: COMMERCIAL

## 2021-01-22 ENCOUNTER — OUTPATIENT (OUTPATIENT)
Dept: OUTPATIENT SERVICES | Facility: HOSPITAL | Age: 46
LOS: 1 days | End: 2021-01-22
Payer: SELF-PAY

## 2021-01-22 VITALS — SYSTOLIC BLOOD PRESSURE: 128 MMHG | TEMPERATURE: 98 F | DIASTOLIC BLOOD PRESSURE: 80 MMHG

## 2021-01-22 DIAGNOSIS — Z98.51 TUBAL LIGATION STATUS: Chronic | ICD-10-CM

## 2021-01-22 DIAGNOSIS — N39.3 STRESS INCONTINENCE (FEMALE) (MALE): ICD-10-CM

## 2021-01-22 DIAGNOSIS — Z90.49 ACQUIRED ABSENCE OF OTHER SPECIFIED PARTS OF DIGESTIVE TRACT: Chronic | ICD-10-CM

## 2021-01-22 DIAGNOSIS — N81.11 CYSTOCELE, MIDLINE: ICD-10-CM

## 2021-01-22 DIAGNOSIS — N76.0 ACUTE VAGINITIS: ICD-10-CM

## 2021-01-22 LAB
BILIRUB UR QL STRIP: NORMAL
CLARITY UR: CLEAR
COLLECTION METHOD: NORMAL
GLUCOSE UR-MCNC: 1000
HCG UR QL: 0.2 EU/DL
HGB UR QL STRIP.AUTO: NORMAL
KETONES UR-MCNC: NORMAL
LEUKOCYTE ESTERASE UR QL STRIP: NORMAL
NITRITE UR QL STRIP: NORMAL
PH UR STRIP: 5
PROT UR STRIP-MCNC: NORMAL
SP GR UR STRIP: 1.02

## 2021-01-22 PROCEDURE — 99215 OFFICE O/P EST HI 40 MIN: CPT | Mod: GC

## 2021-01-22 PROCEDURE — G0463: CPT

## 2021-01-22 NOTE — OB HISTORY
[Total Preg ___] : : [unfilled] [Full Term ___] : [unfilled] (full-term) [Premature ___] : [unfilled] (premature) [Abortions ___] : [unfilled] (abortions) [Living ___] : [unfilled] (living) [Vaginal ___] : [unfilled] vaginal delivery(s)

## 2021-01-22 NOTE — REASON FOR VISIT
[Initial Visit ___] : an initial visit for [unfilled] [Urinary Incontinence] : urinary incontinence [Spouse] : spouse [Declined  Services] : Patient was offered free  services in ~his/her~ preferred language and declined services. Patient insisted on family member providing interpretation   [Questionnaire Received] : Patient questionnaire received

## 2021-01-22 NOTE — DISCUSSION/SUMMARY
[FreeTextEntry1] : 46yo P2 presenting with 1 year hx of PA symptoms and an asymptomatic cystocele. We reviewed the findings with Umm and her . For her cystocele, we discussed management options including observation, pelvic floor exercises, pessary, or surgery. As she is asymptomatic currently, we recommend observation for now. \par \par For her stress incontinence, we counseled patient and her  on diagnoses and all options of treatment including: observation, pelvic floor exercises with or without PT, pessary, incontinence devices, periurethral bulking, medications such as imipramine, or surgical management. \par \par Pt does not active management at this time for either condition and wishes to proceed with observation. IUGA information printed for patient on PA, kegel exercises in Portuguese and provided to patient. \par \par Urine dipstick today showed glucose and trace blood, will send for UA, UCx. Informed patient of the glucosuria and need for follow up with her PCP for further management. \par \par All questions answered. \par Pt seen with Dr. Hardy, UroGyn Fellow\par Pt to return to GYN clinic for routine follow up and return to UroGyn Clinic as needed.

## 2021-01-22 NOTE — HISTORY OF PRESENT ILLNESS
[Cystocele (Obstetric)] : no [Stress Incontinence] : no [Urinary Frequency] : no [Feelings Of Urinary Urgency] : no [Urinary Tract Infection] : mild [Constipation Obstructed Defecation] : no [Unable To Restrain Bowel Movement] : no [x1] : nocturia once nightly [] : no [de-identified] : Over 1 year, occurring over half the time with sneezing/laughing. Not routinely wearing pads. [FreeTextEntry1] : \par 46yo P2 presenting with 1 year hx of PA symptoms. Reports laughing/coughing sneezing results in leakage of urine. PA episodes occur infrequently. She has completed childbearing. \par \par PMH: DM\par PSH: CD + BTL, laparoscopic cholecystectomy \par Soc: , nonsmoker

## 2021-01-22 NOTE — PHYSICAL EXAM
[Chaperone Present] : A chaperone was present in the examining room during all aspects of the physical examination [Labia Majora] : were normal [Labia Minora] : were normal [Normal Appearance] : general appearance was normal [Cystocele] : a cystocele [No Bleeding] : there was no active vaginal bleeding [Aa ____] : Aa [unfilled] [Ba ____] : Ba [unfilled] [C ____] : C [unfilled] [GH ____] : GH [unfilled] [PB ____] : PB [unfilled] [TVL ____] : TVL  [unfilled] [Ap ____] : Ap [unfilled] [Bp ____] : Bp [unfilled] [D ____] : D [unfilled] [Soft] :  the cervix was soft [Uterine Adnexae] : were not tender and not enlarged [Normal] : no abnormalities [Exam Deferred] : was deferred [FreeTextEntry1] : \par General: Well, appearing. Alert and orientated. No acute distress\par HEENT: Normocephalic, atraumatic and extraocular muscles appear to be intact \par Neck: Full range of motion, no obvious lymphadenopathy, deformities, or masses noted \par Respiratory: Speaking in full sentences comfortably, normal work of breathing and no cough during visit\par Musculoskeletal: active full range of motion in extremities \par Extremities: No upper extremity edema noted\par Skin: no obvious rash or skin lesions\par Neuro: Orientated X 3, speech is fluent, normal rate\par Psych: Normal mood and affect\par  [FreeTextEntry3] : +hypermobility

## 2021-01-25 LAB — BACTERIA UR CULT: NORMAL

## 2021-01-28 ENCOUNTER — APPOINTMENT (OUTPATIENT)
Dept: OPHTHALMOLOGY | Facility: CLINIC | Age: 46
End: 2021-01-28

## 2021-02-01 LAB
APPEARANCE: CLEAR
BACTERIA: NEGATIVE
BILIRUBIN URINE: NEGATIVE
BLOOD URINE: NEGATIVE
COLOR: YELLOW
GLUCOSE QUALITATIVE U: ABNORMAL
HYALINE CASTS: 0 /LPF
KETONES URINE: NEGATIVE
LEUKOCYTE ESTERASE URINE: NEGATIVE
MICROSCOPIC-UA: NORMAL
NITRITE URINE: NEGATIVE
PH URINE: 5.5
PROTEIN URINE: NORMAL
RED BLOOD CELLS URINE: 1 /HPF
SPECIFIC GRAVITY URINE: 1.04
SQUAMOUS EPITHELIAL CELLS: 1 /HPF
UROBILINOGEN URINE: NORMAL
WHITE BLOOD CELLS URINE: 1 /HPF

## 2021-04-08 ENCOUNTER — APPOINTMENT (OUTPATIENT)
Dept: OPHTHALMOLOGY | Facility: CLINIC | Age: 46
End: 2021-04-08

## 2021-04-08 ENCOUNTER — OUTPATIENT (OUTPATIENT)
Dept: OUTPATIENT SERVICES | Facility: HOSPITAL | Age: 46
LOS: 1 days | End: 2021-04-08
Payer: SELF-PAY

## 2021-04-08 ENCOUNTER — APPOINTMENT (OUTPATIENT)
Dept: DERMATOLOGY | Facility: HOSPITAL | Age: 46
End: 2021-04-08
Payer: COMMERCIAL

## 2021-04-08 VITALS
WEIGHT: 183 LBS | BODY MASS INDEX: 35.93 KG/M2 | RESPIRATION RATE: 14 BRPM | HEIGHT: 60 IN | TEMPERATURE: 97.2 F | DIASTOLIC BLOOD PRESSURE: 78 MMHG | SYSTOLIC BLOOD PRESSURE: 132 MMHG | HEART RATE: 55 BPM

## 2021-04-08 DIAGNOSIS — Z98.51 TUBAL LIGATION STATUS: Chronic | ICD-10-CM

## 2021-04-08 DIAGNOSIS — L98.9 DISORDER OF THE SKIN AND SUBCUTANEOUS TISSUE, UNSPECIFIED: ICD-10-CM

## 2021-04-08 DIAGNOSIS — Z90.49 ACQUIRED ABSENCE OF OTHER SPECIFIED PARTS OF DIGESTIVE TRACT: Chronic | ICD-10-CM

## 2021-04-08 PROCEDURE — G0463: CPT

## 2021-04-08 PROCEDURE — 99202 OFFICE O/P NEW SF 15 MIN: CPT

## 2021-04-09 DIAGNOSIS — R22.9 LOCALIZED SWELLING, MASS AND LUMP, UNSPECIFIED: ICD-10-CM

## 2021-04-19 ENCOUNTER — OUTPATIENT (OUTPATIENT)
Dept: OUTPATIENT SERVICES | Facility: HOSPITAL | Age: 46
LOS: 1 days | End: 2021-04-19

## 2021-04-19 ENCOUNTER — APPOINTMENT (OUTPATIENT)
Dept: PLASTIC SURGERY | Facility: HOSPITAL | Age: 46
End: 2021-04-19

## 2021-04-19 VITALS
WEIGHT: 183 LBS | BODY MASS INDEX: 35.93 KG/M2 | DIASTOLIC BLOOD PRESSURE: 75 MMHG | HEIGHT: 60 IN | TEMPERATURE: 98.1 F | HEART RATE: 77 BPM | SYSTOLIC BLOOD PRESSURE: 131 MMHG

## 2021-04-19 DIAGNOSIS — Z90.49 ACQUIRED ABSENCE OF OTHER SPECIFIED PARTS OF DIGESTIVE TRACT: Chronic | ICD-10-CM

## 2021-04-19 DIAGNOSIS — Z98.51 TUBAL LIGATION STATUS: Chronic | ICD-10-CM

## 2021-04-19 NOTE — ASSESSMENT
[FreeTextEntry1] : Pt is a 45F with DM who presents with 1 year h/o back mass that has slowly grown, on exam appears to be superficial to the muscle, consistent with lipoma or cyst, desires to have it excised in clinic\par \par - will plan for clinic procedure under local\par - explained risks, benefits, alternatives and patient amenable with proceeding. Risks include bleeding, infection, scarring and poor wound healing, remote possibility of further care depending on pathology.\par - needs 30 min procedure visit, post care explained\par \par Aiden Webb, PGY2

## 2021-04-19 NOTE — HISTORY OF PRESENT ILLNESS
[FreeTextEntry1] : Pt is a 45F, h/o DM, with a 1 year history of a subcutaneous midline back mass that has slowly grown over time. No discharge or fluctuation. Occasionally painful. Non-smoker, not on aspirin or blood thinners. Desires to have mass excised.

## 2021-04-19 NOTE — PHYSICAL EXAM
[NI] : Normal [de-identified] : Midline lower thoracic/upper lumbar subcutaneous back mass, soft and does not animate\par with back flexion or extension

## 2021-04-21 DIAGNOSIS — R22.2 LOCALIZED SWELLING, MASS AND LUMP, TRUNK: ICD-10-CM

## 2021-04-26 ENCOUNTER — APPOINTMENT (OUTPATIENT)
Dept: PLASTIC SURGERY | Facility: HOSPITAL | Age: 46
End: 2021-04-26

## 2021-04-26 ENCOUNTER — OUTPATIENT (OUTPATIENT)
Dept: OUTPATIENT SERVICES | Facility: HOSPITAL | Age: 46
LOS: 1 days | End: 2021-04-26
Payer: COMMERCIAL

## 2021-04-26 ENCOUNTER — RESULT REVIEW (OUTPATIENT)
Age: 46
End: 2021-04-26

## 2021-04-26 VITALS
HEIGHT: 60 IN | TEMPERATURE: 98.1 F | WEIGHT: 183 LBS | BODY MASS INDEX: 35.93 KG/M2 | DIASTOLIC BLOOD PRESSURE: 66 MMHG | SYSTOLIC BLOOD PRESSURE: 126 MMHG | HEART RATE: 64 BPM

## 2021-04-26 DIAGNOSIS — Z90.49 ACQUIRED ABSENCE OF OTHER SPECIFIED PARTS OF DIGESTIVE TRACT: Chronic | ICD-10-CM

## 2021-04-26 DIAGNOSIS — Z98.51 TUBAL LIGATION STATUS: Chronic | ICD-10-CM

## 2021-04-26 PROCEDURE — 88305 TISSUE EXAM BY PATHOLOGIST: CPT | Mod: 26

## 2021-04-26 NOTE — ASSESSMENT
[FreeTextEntry1] : Pt is a 45F with presumed back lipoma, removed in clinic under local anesthesia. \par \par Return to clinic next week for suture removal\par Okay to shower tomorrow, no heavy lifting/straining

## 2021-04-26 NOTE — PROCEDURE
[Nl] : None [FreeTextEntry1] : Back mass [FreeTextEntry2] : Excision of back mass [FreeTextEntry6] : Patient with history of back mass over 1 year causing pain and discomfort. Desired to have it removed. Risks and benefits reviewed, and patient consented to the procedure. Local anesthesia was administered with 1% lidocaine with epinephrine, 20mL total injected around the lesion. Site was prepped and draped in sterile fashion. Horizontal incision made over the length of the mass. Sharp and blunt dissection was used to isolate the mass from the surrounding subcutaneous space, muscle fascia was left intact, and the mass was removed with its capsule. The wound was irrigated. Hemostasis was achieved with electrocautery. Three-layered closure was performed, and incision was dressed in sterile fashion. Pt tolerated procedure well without issue, post-care reviewed. [FreeTextEntry3] : 1% lidocaine with epinephrine, 20mL [FreeTextEntry7] : back mass

## 2021-04-27 DIAGNOSIS — D48.9 NEOPLASM OF UNCERTAIN BEHAVIOR, UNSPECIFIED: ICD-10-CM

## 2021-04-30 LAB — SURGICAL PATHOLOGY STUDY: SIGNIFICANT CHANGE UP

## 2021-05-03 ENCOUNTER — APPOINTMENT (OUTPATIENT)
Dept: PLASTIC SURGERY | Facility: HOSPITAL | Age: 46
End: 2021-05-03

## 2021-05-03 ENCOUNTER — OUTPATIENT (OUTPATIENT)
Dept: OUTPATIENT SERVICES | Facility: HOSPITAL | Age: 46
LOS: 1 days | End: 2021-05-03

## 2021-05-03 ENCOUNTER — NON-APPOINTMENT (OUTPATIENT)
Age: 46
End: 2021-05-03

## 2021-05-03 VITALS
TEMPERATURE: 98 F | HEIGHT: 60 IN | DIASTOLIC BLOOD PRESSURE: 67 MMHG | SYSTOLIC BLOOD PRESSURE: 136 MMHG | HEART RATE: 64 BPM | WEIGHT: 181 LBS | BODY MASS INDEX: 35.53 KG/M2

## 2021-05-03 DIAGNOSIS — Z90.49 ACQUIRED ABSENCE OF OTHER SPECIFIED PARTS OF DIGESTIVE TRACT: Chronic | ICD-10-CM

## 2021-05-03 DIAGNOSIS — Z98.51 TUBAL LIGATION STATUS: Chronic | ICD-10-CM

## 2021-05-03 NOTE — ASSESSMENT
[FreeTextEntry1] : Pt is a 45F who presents for follow up 1 week after removal of back lipoma in clinic (confirmed by pathology). Since doing well.\par \par - Nylon suture removed today, Steri-strips placed\par - okay to shower, avoid heavy lifting or straining for 4 weeks\par - return to clinic PRN\par \par

## 2021-05-03 NOTE — HISTORY OF PRESENT ILLNESS
[FreeTextEntry1] : Pt is a 45F who returns to clinic 1 week following excision of 3x2 cm back lipoma (pathology confirmed the diagnosis). Since the procedure in clinic, she feels well, no fevers/chills, has minor aching in the back around the site of the excision, but otherwise doing well.

## 2021-05-03 NOTE — PHYSICAL EXAM
[NI] : Normal [de-identified] : Incision c/d/i, Nylon suture removed, surrounding skin soft and healthy, no e/o erythema or \par bruising

## 2021-06-15 ENCOUNTER — OUTPATIENT (OUTPATIENT)
Dept: OUTPATIENT SERVICES | Facility: HOSPITAL | Age: 46
LOS: 1 days | End: 2021-06-15
Payer: SELF-PAY

## 2021-06-15 ENCOUNTER — APPOINTMENT (OUTPATIENT)
Dept: GASTROENTEROLOGY | Facility: HOSPITAL | Age: 46
End: 2021-06-15

## 2021-06-15 VITALS
SYSTOLIC BLOOD PRESSURE: 111 MMHG | DIASTOLIC BLOOD PRESSURE: 72 MMHG | TEMPERATURE: 96.8 F | HEART RATE: 80 BPM | WEIGHT: 177 LBS | HEIGHT: 59.5 IN | BODY MASS INDEX: 35.21 KG/M2

## 2021-06-15 DIAGNOSIS — Z90.49 ACQUIRED ABSENCE OF OTHER SPECIFIED PARTS OF DIGESTIVE TRACT: Chronic | ICD-10-CM

## 2021-06-15 DIAGNOSIS — E66.9 OBESITY, UNSPECIFIED: ICD-10-CM

## 2021-06-15 DIAGNOSIS — Z87.898 PERSONAL HISTORY OF OTHER SPECIFIED CONDITIONS: ICD-10-CM

## 2021-06-15 DIAGNOSIS — Z90.49 ACQUIRED ABSENCE OF OTHER SPECIFIED PARTS OF DIGESTIVE TRACT: ICD-10-CM

## 2021-06-15 DIAGNOSIS — R10.9 UNSPECIFIED ABDOMINAL PAIN: ICD-10-CM

## 2021-06-15 DIAGNOSIS — Z87.19 PERSONAL HISTORY OF OTHER DISEASES OF THE DIGESTIVE SYSTEM: ICD-10-CM

## 2021-06-15 DIAGNOSIS — K63.5 POLYP OF COLON: ICD-10-CM

## 2021-06-15 DIAGNOSIS — D50.0 IRON DEFICIENCY ANEMIA SECONDARY TO BLOOD LOSS (CHRONIC): ICD-10-CM

## 2021-06-15 DIAGNOSIS — Z98.51 TUBAL LIGATION STATUS: Chronic | ICD-10-CM

## 2021-06-15 DIAGNOSIS — K59.09 OTHER CONSTIPATION: ICD-10-CM

## 2021-06-15 PROCEDURE — G0463: CPT

## 2021-06-15 NOTE — HISTORY OF PRESENT ILLNESS
[de-identified] : The patient is a 45 yo F with PMHx T2DM and previous Microcytic anemia(resolved), hx R pyelonephritis, L Renal Complex cyst (Bosniak 2F, 2019), morbid obesity, chronic joint pain who presents for colonoscopy referral.  \par \par patient states she had a colonoscopy about ten years ago for constipation and had multiple polyps removed.  Otherwise has no GI history other than constipation stating she moves her bowels two times a week with no blood or black.  Patient is still having heavy menstration.  last CBC with hemoglobin in the 11's.  No upepr GI symptoms.

## 2021-06-15 NOTE — END OF VISIT
[] : Fellow [FreeTextEntry3] : As modified and discussed with patient\par MD SUZETTE Maldonado [facility]\par Associate Professor of Medicine\par [facility]\par

## 2021-06-15 NOTE — REASON FOR VISIT
[Initial Evaluation] : an initial evaluation [FreeTextEntry1] : colon polyps, iron deficiency anemia

## 2021-06-15 NOTE — ASSESSMENT
[FreeTextEntry1] : Impression:\par #Colon cancer surveillance - over due based on patient report of polyps ten years ago\par #Constipation - likely slow transit will start treatment with Miralax\par #Anemia - with heavy mensuration, no GI complaints but does have iron def anemia\par \par Recommendations:\par  - would start Miralax daily and increase as needed for constipation\par  - EGD/Colonoscopy needed\par  - will prep with Golytley\par

## 2021-06-15 NOTE — PHYSICAL EXAM
[General Appearance - Alert] : alert [General Appearance - In No Acute Distress] : in no acute distress [Bowel Sounds] : normal bowel sounds [Abdomen Soft] : soft [Abdomen Tenderness] : non-tender [Abdomen Mass (___ Cm)] : no abdominal mass palpated [Abnormal Walk] : normal gait [Nail Clubbing] : no clubbing  or cyanosis of the fingernails [Musculoskeletal - Swelling] : no joint swelling seen [Motor Tone] : muscle strength and tone were normal [Skin Color & Pigmentation] : normal skin color and pigmentation [] : no rash [Skin Turgor] : normal skin turgor [Deep Tendon Reflexes (DTR)] : deep tendon reflexes were 2+ and symmetric [Sensation] : the sensory exam was normal to light touch and pinprick [No Focal Deficits] : no focal deficits [Oriented To Time, Place, And Person] : oriented to person, place, and time [Impaired Insight] : insight and judgment were intact [Affect] : the affect was normal

## 2021-07-14 ENCOUNTER — NON-APPOINTMENT (OUTPATIENT)
Age: 46
End: 2021-07-14

## 2021-07-22 ENCOUNTER — OUTPATIENT (OUTPATIENT)
Dept: OUTPATIENT SERVICES | Facility: HOSPITAL | Age: 46
LOS: 1 days | End: 2021-07-22
Payer: SELF-PAY

## 2021-07-22 ENCOUNTER — RESULT REVIEW (OUTPATIENT)
Age: 46
End: 2021-07-22

## 2021-07-22 VITALS
RESPIRATION RATE: 16 BRPM | OXYGEN SATURATION: 99 % | WEIGHT: 182.1 LBS | HEART RATE: 73 BPM | SYSTOLIC BLOOD PRESSURE: 126 MMHG | TEMPERATURE: 97 F | DIASTOLIC BLOOD PRESSURE: 73 MMHG | HEIGHT: 60 IN

## 2021-07-22 VITALS
RESPIRATION RATE: 19 BRPM | DIASTOLIC BLOOD PRESSURE: 64 MMHG | OXYGEN SATURATION: 98 % | HEART RATE: 69 BPM | SYSTOLIC BLOOD PRESSURE: 138 MMHG

## 2021-07-22 DIAGNOSIS — Z98.51 TUBAL LIGATION STATUS: Chronic | ICD-10-CM

## 2021-07-22 DIAGNOSIS — Z90.49 ACQUIRED ABSENCE OF OTHER SPECIFIED PARTS OF DIGESTIVE TRACT: Chronic | ICD-10-CM

## 2021-07-22 DIAGNOSIS — D64.9 ANEMIA, UNSPECIFIED: ICD-10-CM

## 2021-07-22 PROCEDURE — 45380 COLONOSCOPY AND BIOPSY: CPT

## 2021-07-22 PROCEDURE — 88305 TISSUE EXAM BY PATHOLOGIST: CPT | Mod: 26

## 2021-07-22 PROCEDURE — 88305 TISSUE EXAM BY PATHOLOGIST: CPT

## 2021-07-22 PROCEDURE — 43239 EGD BIOPSY SINGLE/MULTIPLE: CPT | Mod: GC

## 2021-07-22 PROCEDURE — 45380 COLONOSCOPY AND BIOPSY: CPT | Mod: GC

## 2021-07-22 PROCEDURE — 43239 EGD BIOPSY SINGLE/MULTIPLE: CPT

## 2021-07-22 RX ORDER — SODIUM CHLORIDE 9 MG/ML
500 INJECTION INTRAMUSCULAR; INTRAVENOUS; SUBCUTANEOUS
Refills: 0 | Status: COMPLETED | OUTPATIENT
Start: 2021-07-22 | End: 2021-07-22

## 2021-07-22 RX ORDER — ATORVASTATIN CALCIUM 80 MG/1
1 TABLET, FILM COATED ORAL
Qty: 0 | Refills: 0 | DISCHARGE

## 2021-07-22 RX ORDER — GLIMEPIRIDE 1 MG
1 TABLET ORAL
Qty: 0 | Refills: 0 | DISCHARGE

## 2021-07-22 RX ADMIN — SODIUM CHLORIDE 30 MILLILITER(S): 9 INJECTION INTRAMUSCULAR; INTRAVENOUS; SUBCUTANEOUS at 14:17

## 2021-07-22 NOTE — ASU PATIENT PROFILE, ADULT - PRO ARRIVE FROM
Patient called office back. Writer asked to see if patient completed lab work for MD Lake. Patient stated that he was sick but plans on going in tomorrow, 1/8, to complete MD Barry's labs. Patient stated that he is having insurance issues and was told by [facility] that they would be faxing over papers for MD Barry to complete so they can properly run the medications through patient's insurance. Writer informed patient that we have not received any paperwork as of yet but will give it to MD Barry as soon as we received them. Patient acknowledged. Patient had no further questions.   home

## 2021-07-22 NOTE — PRE PROCEDURE NOTE - PRE PROCEDURE EVALUATION
Attending Physician: Dr. Briggs                            Procedure: Colonoscopy and upper endoscopy    Indication for Procedure: Anemia, history of polyps  ________________________________________________________  PAST MEDICAL & SURGICAL HISTORY:  Diabetes mellitus    Anemia    No Past Surgical History    History of cholecystectomy    H/O tubal ligation      ALLERGIES:  No Known Allergies    HOME MEDICATIONS:  atorvastatin 40 mg oral tablet: 1 tab(s) orally once a day  glimepiride 2 mg oral tablet: 1 tab(s) orally once a day  metformin:  orally     AICD/PPM: [ ] yes   [ ] no    PERTINENT LAB DATA:                      PHYSICAL EXAMINATION:    T(C): --  HR: --  BP: --  RR: --  SpO2: --    Constitutional: NAD    Neck:  No JVD  Respiratory: CTAB/L  Cardiovascular: S1 and S2  Gastrointestinal: BS+, soft, NT/ND  Extremities: No peripheral edema  Neurological: A/O x 3, no focal deficits        COMMENTS:    The patient is a suitable candidate for the planned procedure unless box checked [ ]  No, explain:

## 2021-07-28 ENCOUNTER — RESULT CHARGE (OUTPATIENT)
Age: 46
End: 2021-07-28

## 2021-07-28 ENCOUNTER — LABORATORY RESULT (OUTPATIENT)
Age: 46
End: 2021-07-28

## 2021-07-28 ENCOUNTER — APPOINTMENT (OUTPATIENT)
Dept: INTERNAL MEDICINE | Facility: CLINIC | Age: 46
End: 2021-07-28

## 2021-07-28 ENCOUNTER — OUTPATIENT (OUTPATIENT)
Dept: OUTPATIENT SERVICES | Facility: HOSPITAL | Age: 46
LOS: 1 days | End: 2021-07-28
Payer: SELF-PAY

## 2021-07-28 VITALS
OXYGEN SATURATION: 98 % | SYSTOLIC BLOOD PRESSURE: 130 MMHG | BODY MASS INDEX: 35.81 KG/M2 | HEART RATE: 80 BPM | WEIGHT: 180 LBS | HEIGHT: 59.5 IN | DIASTOLIC BLOOD PRESSURE: 70 MMHG

## 2021-07-28 DIAGNOSIS — Z98.51 TUBAL LIGATION STATUS: Chronic | ICD-10-CM

## 2021-07-28 DIAGNOSIS — Z90.49 ACQUIRED ABSENCE OF OTHER SPECIFIED PARTS OF DIGESTIVE TRACT: Chronic | ICD-10-CM

## 2021-07-28 DIAGNOSIS — I10 ESSENTIAL (PRIMARY) HYPERTENSION: ICD-10-CM

## 2021-07-28 LAB — HBA1C MFR BLD HPLC: 10.5

## 2021-07-28 PROCEDURE — G0463: CPT | Mod: 25

## 2021-07-28 PROCEDURE — 82043 UR ALBUMIN QUANTITATIVE: CPT

## 2021-07-28 PROCEDURE — 84443 ASSAY THYROID STIM HORMONE: CPT

## 2021-07-28 PROCEDURE — 80053 COMPREHEN METABOLIC PANEL: CPT

## 2021-07-28 PROCEDURE — 83036 HEMOGLOBIN GLYCOSYLATED A1C: CPT

## 2021-07-28 RX ORDER — BLOOD SUGAR DIAGNOSTIC
STRIP MISCELLANEOUS 4 TIMES DAILY
Qty: 1 | Refills: 9 | Status: DISCONTINUED | COMMUNITY
Start: 2020-10-18 | End: 2021-07-28

## 2021-07-28 RX ORDER — BLOOD-GLUCOSE METER
W/DEVICE KIT MISCELLANEOUS
Qty: 1 | Refills: 0 | Status: DISCONTINUED | COMMUNITY
Start: 2020-10-18 | End: 2021-07-28

## 2021-07-28 RX ORDER — ISOPROPYL ALCOHOL 0.7 ML/ML
SWAB TOPICAL
Qty: 1 | Refills: 0 | Status: DISCONTINUED | COMMUNITY
Start: 2020-10-18 | End: 2021-07-28

## 2021-07-28 RX ORDER — LANCETS 33 GAUGE
EACH MISCELLANEOUS
Qty: 1 | Refills: 6 | Status: DISCONTINUED | COMMUNITY
Start: 2020-10-18 | End: 2021-07-28

## 2021-07-28 RX ORDER — POLYETHYLENE GLYCOL 3350 17 G/17G
17 POWDER, FOR SOLUTION ORAL
Qty: 14 | Refills: 0 | Status: DISCONTINUED | COMMUNITY
Start: 2021-06-15 | End: 2021-07-28

## 2021-07-28 NOTE — PHYSICAL EXAM
[No Lymphadenopathy] : no lymphadenopathy [Thyroid Normal, No Nodules] : the thyroid was normal and there were no nodules present [Normal] : soft, non-tender, non-distended, no masses palpated, no HSM and normal bowel sounds [Comprehensive Foot Exam Normal] : Right and left foot were examined and both feet are normal. No ulcers in either foot. Toes are normal and with full ROM.  Normal tactile sensation with monofilament testing throughout both feet

## 2021-07-28 NOTE — HISTORY OF PRESENT ILLNESS
[Spouse] : spouse [FreeTextEntry1] : Was told by doctor to come back for lab and check up  [de-identified] : 46 y.o. F Malawian-speaking female with T2DM, obesity, chronic joint pain, left renal cyst (Bosniak 2F, 2019) presents for check up. \par \par Pt was last seen by medicine service 10/2020, during which her lab work was resulted in [facility] instead of [facility]. Pt's A1C was elevated at 9.8% (up from 9.5%) at the time despite taking glimepiride 2mg qD and metformin 1000mg BID. Lab work at the time also revealed transaminitis. Pt reports she never got a call or notification of her results, therefore did not know she was supposed to continue antidiabetic agents. Per chart review, her antidiabetic agents were last filled in 06/2020. \par \par Pt then called previous PCP Dr. Arreguin last week (07/2021) inquiring lab results and whether she should follow up routinely. Dr. Arreguin discovered abnormal labs that are located on [facility], and sent prescriptions of glimepiride 2mg qD and metformin 1000mg BID, as well as atorvastatin 40mg qHS, and he urged pt to return to clinic for follow up and repeat blood work. \par \par # T2DM \par - Just started Metformin 1000 mg BID & Glymepiride 2mg qD for about a week due to miscommunication/lost to follow up \par - diet consists of rice, pasta, bread, meat, sometimes salads/ soups \par - drinks water mostly, sometimes juice but dilutes with water. No soda or sweet tea \par - walks about twice a week, no other physical activities \par - thinks she can try to walk/run on treadmill 2x / week moving forward \par - saw ophtho April \par \par # Transaminitis \par - no RUQ pain, n/v \par - mother passed from liver cancer, was never a drinker, unsure if had hepatic infections  \par - pt herself drinks a glass of wine occasionally\par - hep panel 01/2020 was negative  \par \par # Constipation \par - saw GI in June, underwent colonoscopy EGD 7/22/21 \par - drinks 2-3 large jugs of water \par - eats more carbs than fiber \par - would prefer powder over the current liquid \par \par # Hair loss\par - denies tremors, palpitations   \par - feels nervous \par - no skin changes \par \par # HCM\par - GI underwent colonoscopy & EGD 7/22/21 \par

## 2021-07-28 NOTE — ASSESSMENT
[FreeTextEntry1] : 46 y.o. F Uzbek-speaking female with T2DM, obesity, chronic joint pain, left renal cyst (Bosniak 2F, 2019) presents for follow up of her chronic medical problems. \par \par # T2DM - uncontolled\par - A1C increased from 9.8% in 10/2020 to 10.5% today in office\par - Pt and  would like to try lifestyle modification and given antidiabetic agents were just resumed for about 1 week only, they would like to try oral medications first. Pt became tearful when insulin was brought up during our discussion\par - c/w Glimepiride 5mg BID and Metformin 1000mg BID \par - Tasked nutritionist to facilitate with diet modification\par - Tasked pharmacist to facilitate with more affordable antidiabetic agent. Ideally would start GLP1 agonist but pt uninsured. \par - Extensive lifestyle modification counseling provided\par - Send urine microalbumin today. DM foot exam completed. Pt saw ophtho 04/2021\par - RTC in 5 weeks to repeat POCT A1C. If worsening, will re-discuss starting insulin\par \par # Transaminitis \par - unclear etiology, no significant ETOH use, lipid panel 2019 LDL not significantly elevated, hep panel negative 1/2020 \par - will repeat CMP and if persistently elevated, f/u with RUQ u/s \par \par # Hair loss/ constipation\par - Miralax PRN\par - f/u Cscope and EGD results. Not shown on [facility]? \par - check TSH and FT4 \par \par \par

## 2021-07-29 LAB
ALBUMIN SERPL ELPH-MCNC: 4.4 G/DL — SIGNIFICANT CHANGE UP (ref 3.3–5)
ALP SERPL-CCNC: 153 U/L — HIGH (ref 40–120)
ALT FLD-CCNC: 99 U/L — HIGH (ref 10–45)
ANION GAP SERPL CALC-SCNC: 11 MMOL/L — SIGNIFICANT CHANGE UP (ref 5–17)
AST SERPL-CCNC: 102 U/L — HIGH (ref 10–40)
BILIRUB SERPL-MCNC: 0.3 MG/DL — SIGNIFICANT CHANGE UP (ref 0.2–1.2)
BUN SERPL-MCNC: 18 MG/DL — SIGNIFICANT CHANGE UP (ref 7–23)
CALCIUM SERPL-MCNC: 9.6 MG/DL — SIGNIFICANT CHANGE UP (ref 8.4–10.5)
CHLORIDE SERPL-SCNC: 102 MMOL/L — SIGNIFICANT CHANGE UP (ref 96–108)
CO2 SERPL-SCNC: 27 MMOL/L — SIGNIFICANT CHANGE UP (ref 22–31)
CREAT ?TM UR-MCNC: 163 MG/DL — SIGNIFICANT CHANGE UP
CREAT SERPL-MCNC: 0.68 MG/DL — SIGNIFICANT CHANGE UP (ref 0.5–1.3)
GLUCOSE SERPL-MCNC: 211 MG/DL — HIGH (ref 70–99)
MICROALBUMIN UR-MCNC: 2 MG/DL — SIGNIFICANT CHANGE UP
MICROALBUMIN/CREAT UR-RTO: 12 MG/G — SIGNIFICANT CHANGE UP (ref 0–30)
POTASSIUM SERPL-MCNC: 4.8 MMOL/L — SIGNIFICANT CHANGE UP (ref 3.5–5.3)
POTASSIUM SERPL-SCNC: 4.8 MMOL/L — SIGNIFICANT CHANGE UP (ref 3.5–5.3)
PROT SERPL-MCNC: 7.4 G/DL — SIGNIFICANT CHANGE UP (ref 6–8.3)
SODIUM SERPL-SCNC: 139 MMOL/L — SIGNIFICANT CHANGE UP (ref 135–145)
SURGICAL PATHOLOGY STUDY: SIGNIFICANT CHANGE UP
T4 FREE+ TSH PNL SERPL: 1.77 UIU/ML — SIGNIFICANT CHANGE UP (ref 0.27–4.2)

## 2021-07-30 ENCOUNTER — RESULT REVIEW (OUTPATIENT)
Age: 46
End: 2021-07-30

## 2021-07-30 ENCOUNTER — NON-APPOINTMENT (OUTPATIENT)
Age: 46
End: 2021-07-30

## 2021-07-30 DIAGNOSIS — E66.01 MORBID (SEVERE) OBESITY DUE TO EXCESS CALORIES: ICD-10-CM

## 2021-07-30 DIAGNOSIS — Z86.39 PERSONAL HISTORY OF OTHER ENDOCRINE, NUTRITIONAL AND METABOLIC DISEASE: ICD-10-CM

## 2021-08-02 ENCOUNTER — APPOINTMENT (OUTPATIENT)
Dept: INTERNAL MEDICINE | Facility: CLINIC | Age: 46
End: 2021-08-02

## 2021-08-02 ENCOUNTER — OUTPATIENT (OUTPATIENT)
Dept: OUTPATIENT SERVICES | Facility: HOSPITAL | Age: 46
LOS: 1 days | End: 2021-08-02
Payer: SELF-PAY

## 2021-08-02 DIAGNOSIS — Z98.51 TUBAL LIGATION STATUS: Chronic | ICD-10-CM

## 2021-08-02 DIAGNOSIS — E11.65 TYPE 2 DIABETES MELLITUS WITH HYPERGLYCEMIA: ICD-10-CM

## 2021-08-02 DIAGNOSIS — Z90.49 ACQUIRED ABSENCE OF OTHER SPECIFIED PARTS OF DIGESTIVE TRACT: Chronic | ICD-10-CM

## 2021-08-02 PROCEDURE — 97802 MEDICAL NUTRITION INDIV IN: CPT

## 2021-08-06 ENCOUNTER — OUTPATIENT (OUTPATIENT)
Dept: OUTPATIENT SERVICES | Facility: HOSPITAL | Age: 46
LOS: 1 days | End: 2021-08-06
Payer: COMMERCIAL

## 2021-08-06 ENCOUNTER — APPOINTMENT (OUTPATIENT)
Dept: ULTRASOUND IMAGING | Facility: CLINIC | Age: 46
End: 2021-08-06
Payer: COMMERCIAL

## 2021-08-06 DIAGNOSIS — R74.01 ELEVATION OF LEVELS OF LIVER TRANSAMINASE LEVELS: ICD-10-CM

## 2021-08-06 DIAGNOSIS — Z98.51 TUBAL LIGATION STATUS: Chronic | ICD-10-CM

## 2021-08-06 DIAGNOSIS — Z90.49 ACQUIRED ABSENCE OF OTHER SPECIFIED PARTS OF DIGESTIVE TRACT: Chronic | ICD-10-CM

## 2021-08-06 PROCEDURE — 76705 ECHO EXAM OF ABDOMEN: CPT | Mod: 26

## 2021-08-06 PROCEDURE — 76705 ECHO EXAM OF ABDOMEN: CPT

## 2021-08-10 ENCOUNTER — NON-APPOINTMENT (OUTPATIENT)
Age: 46
End: 2021-08-10

## 2021-08-23 ENCOUNTER — APPOINTMENT (OUTPATIENT)
Dept: INTERNAL MEDICINE | Facility: CLINIC | Age: 46
End: 2021-08-23

## 2021-08-23 DIAGNOSIS — I10 ESSENTIAL (PRIMARY) HYPERTENSION: ICD-10-CM

## 2021-08-23 DIAGNOSIS — Z86.39 PERSONAL HISTORY OF OTHER ENDOCRINE, NUTRITIONAL AND METABOLIC DISEASE: ICD-10-CM

## 2021-09-01 ENCOUNTER — OUTPATIENT (OUTPATIENT)
Dept: OUTPATIENT SERVICES | Facility: HOSPITAL | Age: 46
LOS: 1 days | End: 2021-09-01
Payer: SELF-PAY

## 2021-09-01 ENCOUNTER — LABORATORY RESULT (OUTPATIENT)
Age: 46
End: 2021-09-01

## 2021-09-01 ENCOUNTER — RESULT CHARGE (OUTPATIENT)
Age: 46
End: 2021-09-01

## 2021-09-01 ENCOUNTER — APPOINTMENT (OUTPATIENT)
Dept: INTERNAL MEDICINE | Facility: CLINIC | Age: 46
End: 2021-09-01

## 2021-09-01 ENCOUNTER — RESULT REVIEW (OUTPATIENT)
Age: 46
End: 2021-09-01

## 2021-09-01 VITALS
OXYGEN SATURATION: 98 % | SYSTOLIC BLOOD PRESSURE: 118 MMHG | BODY MASS INDEX: 34.36 KG/M2 | HEIGHT: 60 IN | DIASTOLIC BLOOD PRESSURE: 80 MMHG | WEIGHT: 175 LBS | HEART RATE: 70 BPM

## 2021-09-01 DIAGNOSIS — Z98.51 TUBAL LIGATION STATUS: Chronic | ICD-10-CM

## 2021-09-01 DIAGNOSIS — I10 ESSENTIAL (PRIMARY) HYPERTENSION: ICD-10-CM

## 2021-09-01 DIAGNOSIS — Z90.49 ACQUIRED ABSENCE OF OTHER SPECIFIED PARTS OF DIGESTIVE TRACT: Chronic | ICD-10-CM

## 2021-09-01 LAB — HBA1C MFR BLD HPLC: 8.2

## 2021-09-01 PROCEDURE — G0463: CPT | Mod: 25

## 2021-09-01 PROCEDURE — 83036 HEMOGLOBIN GLYCOSYLATED A1C: CPT

## 2021-09-01 PROCEDURE — 80053 COMPREHEN METABOLIC PANEL: CPT

## 2021-09-01 PROCEDURE — T1013: CPT

## 2021-09-01 PROCEDURE — 80061 LIPID PANEL: CPT

## 2021-09-01 RX ORDER — GLIMEPIRIDE 2 MG/1
2 TABLET ORAL DAILY
Qty: 30 | Refills: 5 | Status: DISCONTINUED | COMMUNITY
Start: 2019-04-09 | End: 2021-09-01

## 2021-09-02 LAB
ALBUMIN SERPL ELPH-MCNC: 4.8 G/DL — SIGNIFICANT CHANGE UP (ref 3.3–5)
ALP SERPL-CCNC: 109 U/L — SIGNIFICANT CHANGE UP (ref 40–120)
ALT FLD-CCNC: 56 U/L — HIGH (ref 10–45)
ANION GAP SERPL CALC-SCNC: 16 MMOL/L — SIGNIFICANT CHANGE UP (ref 5–17)
AST SERPL-CCNC: 51 U/L — HIGH (ref 10–40)
BILIRUB SERPL-MCNC: 0.3 MG/DL — SIGNIFICANT CHANGE UP (ref 0.2–1.2)
BUN SERPL-MCNC: 12 MG/DL — SIGNIFICANT CHANGE UP (ref 7–23)
CALCIUM SERPL-MCNC: 10 MG/DL — SIGNIFICANT CHANGE UP (ref 8.4–10.5)
CHLORIDE SERPL-SCNC: 104 MMOL/L — SIGNIFICANT CHANGE UP (ref 96–108)
CHOLEST SERPL-MCNC: 107 MG/DL — SIGNIFICANT CHANGE UP
CO2 SERPL-SCNC: 23 MMOL/L — SIGNIFICANT CHANGE UP (ref 22–31)
CREAT SERPL-MCNC: 0.56 MG/DL — SIGNIFICANT CHANGE UP (ref 0.5–1.3)
GLUCOSE SERPL-MCNC: 71 MG/DL — SIGNIFICANT CHANGE UP (ref 70–99)
HDLC SERPL-MCNC: 52 MG/DL — SIGNIFICANT CHANGE UP
LIPID PNL WITH DIRECT LDL SERPL: 26 MG/DL — SIGNIFICANT CHANGE UP
NON HDL CHOLESTEROL: 55 MG/DL — SIGNIFICANT CHANGE UP
POTASSIUM SERPL-MCNC: 4.6 MMOL/L — SIGNIFICANT CHANGE UP (ref 3.5–5.3)
POTASSIUM SERPL-SCNC: 4.6 MMOL/L — SIGNIFICANT CHANGE UP (ref 3.5–5.3)
PROT SERPL-MCNC: 7.4 G/DL — SIGNIFICANT CHANGE UP (ref 6–8.3)
SODIUM SERPL-SCNC: 143 MMOL/L — SIGNIFICANT CHANGE UP (ref 135–145)
TRIGL SERPL-MCNC: 144 MG/DL — SIGNIFICANT CHANGE UP

## 2021-09-08 DIAGNOSIS — E11.9 TYPE 2 DIABETES MELLITUS WITHOUT COMPLICATIONS: ICD-10-CM

## 2021-09-08 DIAGNOSIS — R74.01 ELEVATION OF LEVELS OF LIVER TRANSAMINASE LEVELS: ICD-10-CM

## 2021-09-08 DIAGNOSIS — M25.552 PAIN IN LEFT HIP: ICD-10-CM

## 2021-09-08 NOTE — PLAN
[FreeTextEntry1] : \par RTC in 5 weeks for CPE as well as T2DM f/u.\par \par Care discussed with Dr. Bauer. \par \par Marilou Hernandez\par PGY2 \par [facility]

## 2021-09-08 NOTE — PHYSICAL EXAM
[Normal] : soft, non-tender, non-distended, no masses palpated, no HSM and normal bowel sounds [de-identified] : Obese  [de-identified] : Left greater trochanter tender to palpation. + Straight leg raise in left LE.

## 2021-09-08 NOTE — HISTORY OF PRESENT ILLNESS
[[employer] ] : provided by [employer]   [Time Spent: ____ minutes] : Total time spent using  services: [unfilled] minutes. The patient's primary language is not English thus required  services. [Spouse] : spouse [FreeTextEntry1] : [id number] [FreeTextEntry2] : Yomaira [TWNoteComboBox1] : Ethiopian [de-identified] : 46 y.o. Persian-speaking F with PMH of poorly controlled T2DM, obesity, hepatic steatosis, chronic joint pain, left renal cyst, presents for follow up. She was last seen 7/2021 where she was found to have A1C of 10.5% in the s/o lost to follow up. She did not know she was supposed to be taking meds for DM.  \par \par # T2DM \par - At home, sugar levels are 60-70s at times, average usually around 100-140s \par - 3-4 episodes of hypoglycemia per week, fasting glucose levels in the morning \par - When hypoglycemic, feels nervous, headache, dizzy, shaking, drinks juice/eats a piece of fruit when she feels this way\par - On Glimepiride 2mg qD and Metformin 1000mg BID \par - Seen by dietitian. B: eggs with veggies, L: pork and meat with salad, D: potatoes with veggies. Eating less rice, bread. Drinks lots of water. \par - Walking 1 hr daily \par - DM maintenance: foot exam, ophtho exam, urine microalbumin screening all UTD  \par \par # Hepatic steatosis \par - Transaminitis, U/S showing fatty liver disease \par - Diet & exercise as above \par - Takes Atorvastatin 40mg qHS  \par \par # Chronic left hip pain\par - Has always had left hip pain, recently exacerbated with walking now that she is increasing physical activity \par - Reported to have received left hip joint injections in the past by family doctor Jameson Fields \par - Pain is pressure like and radiates down back and side of L thigh \par - Has not needed any pain meds \par - Denies fam h/o arthritis, denies pain in other joints of her body, denies swelling, denies stiffness \par \par # HCM \par - Due for CPE 10/2021  \par - EGD and C/scope 7/2021\par - HPV Pap next due 2025  \par - COVID vaccine moderna 2nd dose 5/2021; ppsv and Tdap UTD

## 2021-09-08 NOTE — ASSESSMENT
[FreeTextEntry1] : 46 y.o. Chinese-speaking F with PMH of poorly controlled T2DM, obesity, hepatic steatosis, chronic joint pain, left renal cyst, presents for follow up of her chronic medical conditions. \par \par # T2DM - Improving\par - POCT A1C 8.2 today, down from 10.5 last month\par - Congratulated pt on lifestyle modifications and success in improving glycemic control  \par - Frequent hypoglycemic episodes\par - DECREASE glimepiride to 1mg qD \par - C/w metformin 1000mg BID\par - DM maintenance: foot exam, ophtho exam, urine microalbumin screening UTD  \par - RTC in 5 wks for glucose monitoring \par \par # Hepatic steatosis \par - C/w Atorvastatin 40mg qHS  \par - Diet and exercise counseling provided \par - Repeat CMP and Lipid panel today \par \par # Chronic left hip pain\par - DDx OA vs bursitis vs sciatica \par - Increasing pain with use and tenderness to palpation concerning for OA and bursitis. Additionally pain reproduced with + straight leg raise test c/f sciatica\par - Check left hip xray \par - Counseled pt against NSAIDs use given stomach ulcer seen on path results from EGD biopsy \par - Advised pt to take up to 2000mg tylenol if needed for pain given transaminitis \par - Lidocaine patches and topical icy hot/bangay as needed \par \par # HCM \par - Due for CPE 10/2021  \par - C/scope 7/22/21 showing: chronic duodenitis, stomach ulcer reactive changes, and ascending colon tubular adenoma  \par - Mammo due 01/2022\par - HPV Pap due 2025  \par - COVID vaccine moderna 2nd dose 5/2021; ppsv and Tdap UTD

## 2021-09-08 NOTE — REVIEW OF SYSTEMS
[Joint Pain] : joint pain [Joint Stiffness] : joint stiffness [Negative] : Respiratory [Joint Swelling] : no joint swelling [Muscle Pain] : no muscle pain

## 2021-10-06 ENCOUNTER — LABORATORY RESULT (OUTPATIENT)
Age: 46
End: 2021-10-06

## 2021-10-06 ENCOUNTER — MED ADMIN CHARGE (OUTPATIENT)
Age: 46
End: 2021-10-06

## 2021-10-06 ENCOUNTER — APPOINTMENT (OUTPATIENT)
Dept: INTERNAL MEDICINE | Facility: CLINIC | Age: 46
End: 2021-10-06

## 2021-10-06 ENCOUNTER — OUTPATIENT (OUTPATIENT)
Dept: OUTPATIENT SERVICES | Facility: HOSPITAL | Age: 46
LOS: 1 days | End: 2021-10-06
Payer: SELF-PAY

## 2021-10-06 VITALS
BODY MASS INDEX: 34.36 KG/M2 | WEIGHT: 175 LBS | DIASTOLIC BLOOD PRESSURE: 80 MMHG | HEIGHT: 60 IN | OXYGEN SATURATION: 98 % | SYSTOLIC BLOOD PRESSURE: 118 MMHG | HEART RATE: 67 BPM

## 2021-10-06 DIAGNOSIS — R74.01 ELEVATION OF LEVELS OF LIVER TRANSAMINASE LEVELS: ICD-10-CM

## 2021-10-06 DIAGNOSIS — Z98.51 TUBAL LIGATION STATUS: Chronic | ICD-10-CM

## 2021-10-06 DIAGNOSIS — I10 ESSENTIAL (PRIMARY) HYPERTENSION: ICD-10-CM

## 2021-10-06 DIAGNOSIS — Z90.49 ACQUIRED ABSENCE OF OTHER SPECIFIED PARTS OF DIGESTIVE TRACT: Chronic | ICD-10-CM

## 2021-10-06 PROCEDURE — G0463: CPT

## 2021-10-06 PROCEDURE — 83036 HEMOGLOBIN GLYCOSYLATED A1C: CPT

## 2021-10-06 PROCEDURE — 85027 COMPLETE CBC AUTOMATED: CPT

## 2021-10-07 LAB
A1C WITH ESTIMATED AVERAGE GLUCOSE RESULT: 6.5 % — HIGH (ref 4–5.6)
ESTIMATED AVERAGE GLUCOSE: 140 MG/DL — HIGH (ref 68–114)
HCT VFR BLD CALC: 39.7 % — SIGNIFICANT CHANGE UP (ref 34.5–45)
HGB BLD-MCNC: 13.3 G/DL — SIGNIFICANT CHANGE UP (ref 11.5–15.5)
MCHC RBC-ENTMCNC: 29.7 PG — SIGNIFICANT CHANGE UP (ref 27–34)
MCHC RBC-ENTMCNC: 33.5 GM/DL — SIGNIFICANT CHANGE UP (ref 32–36)
MCV RBC AUTO: 88.6 FL — SIGNIFICANT CHANGE UP (ref 80–100)
PLATELET # BLD AUTO: 175 K/UL — SIGNIFICANT CHANGE UP (ref 150–400)
RBC # BLD: 4.48 M/UL — SIGNIFICANT CHANGE UP (ref 3.8–5.2)
RBC # FLD: 14.4 % — SIGNIFICANT CHANGE UP (ref 10.3–14.5)
WBC # BLD: 6.91 K/UL — SIGNIFICANT CHANGE UP (ref 3.8–10.5)
WBC # FLD AUTO: 6.91 K/UL — SIGNIFICANT CHANGE UP (ref 3.8–10.5)

## 2021-10-07 RX ORDER — GLIMEPIRIDE 1 MG/1
1 TABLET ORAL DAILY
Qty: 30 | Refills: 2 | Status: DISCONTINUED | COMMUNITY
Start: 2021-09-01 | End: 2021-10-07

## 2021-10-13 DIAGNOSIS — E11.9 TYPE 2 DIABETES MELLITUS WITHOUT COMPLICATIONS: ICD-10-CM

## 2021-10-13 DIAGNOSIS — E66.01 MORBID (SEVERE) OBESITY DUE TO EXCESS CALORIES: ICD-10-CM

## 2021-10-13 NOTE — ASSESSMENT
[FreeTextEntry1] : 46 y.o. Gambian speaking F with PMH of T2DM, obesity, hepatic steatosis, chronic joint pain, left renal cyst, presents for follow up of chronic medical conditions.\par \par # T2DM - well controlled\par - No micro/macro vascular complications, maintenance UTD\par - A1C 8.2 in Sept 2021 \par - c/w metformin 1000mg BID\par - c/w glimepiride 1mg qD \par - Repeat A1C today, if remains at goal, can consider discontinuing glimepiride \par \par # Chronic left hip pain \par - Suspect OA and or overuse \par - Advised pt to avoid NSAIDs given gastric ulcer \par - F/u in 5 wks after Xray is obtained. Likely need PT referral and possibly ortho eval if severe OA  \par \par # Perimenopause\par - Will discuss with pharmacist re amberen and safety profile when combined with patient's other medications\par - Discussed SSRI use for hot flashes but pt prefers not to take more medications \par - No vaginal bleeding in between menstruation \par \par #HCM \par - c-scope 7/22/21 with tubular adenoma, f/u in 2028\par - Mammo due 1/22; HPV pap due 2025 \par - Vaccines UTD

## 2021-10-13 NOTE — PLAN
[FreeTextEntry1] : RTC in 5 wks after left hip xray is obtained. \par \par Care discussed with Dr. Bauer\par \par Marilou Hernandez, PGY2\par [facility]

## 2021-10-13 NOTE — HISTORY OF PRESENT ILLNESS
[FreeTextEntry1] : f/u T2DM  [de-identified] : 46 y.o. Syriac speaking F with PMH of poorly controlled T2DM, obesity, hepatic steatosis, chronic joint pain, left renal cyst, presents for follow up of chronic medical conditions. Previously hyperglycemic without knowing she was supposed to be on medications, has since been adherent to medication and very motivated to modify lifestyle with significant improvement in glycemic control. Last seen 9/1 \par \par # T2DM \par - A1C improved from 10.5 to 8.2 from July to Sept, decreased glimepiride to 1mg qD during Sept visit\par - continues to be adherent with metformin 1000mg BID\par - All maintenance UTD (ophtho, foot exam, urine microalbumin) \par - continues to restrict carb in diet, eats mostly vegetables and lean protein\par - walks 3 miles daily \par \par # Chronic left hip pain \par - Xray of left hip ordered but pt has not yet obtained appointment\par - Prefers not to take analgesics \par - noticed worsening pain since walking more \par \par # Hot flashes\par - Noticed more sensitive to temperature changes lately\par - Menses used to be monthly but now more sporadic, at times will go 2-3 months without menstruation\par - Denies bleeding in between menstruation \par - Inquired if Amberen (OTC for menopause) can be taken safely \par \par #HCM \par - c-scope 7/22/21 with tubular adenoma, f/u in 2028\par - Mammo due 1/22; HPV pap due 2025 \par - Vaccines UTD, agrees to flu shot today

## 2021-12-11 ENCOUNTER — OUTPATIENT (OUTPATIENT)
Dept: OUTPATIENT SERVICES | Facility: HOSPITAL | Age: 46
LOS: 1 days | End: 2021-12-11
Payer: SELF-PAY

## 2021-12-11 ENCOUNTER — APPOINTMENT (OUTPATIENT)
Dept: RADIOLOGY | Facility: CLINIC | Age: 46
End: 2021-12-11
Payer: COMMERCIAL

## 2021-12-11 DIAGNOSIS — M25.552 PAIN IN LEFT HIP: ICD-10-CM

## 2021-12-11 DIAGNOSIS — Z98.51 TUBAL LIGATION STATUS: Chronic | ICD-10-CM

## 2021-12-11 DIAGNOSIS — E11.9 TYPE 2 DIABETES MELLITUS WITHOUT COMPLICATIONS: ICD-10-CM

## 2021-12-11 DIAGNOSIS — R74.01 ELEVATION OF LEVELS OF LIVER TRANSAMINASE LEVELS: ICD-10-CM

## 2021-12-11 DIAGNOSIS — Z90.49 ACQUIRED ABSENCE OF OTHER SPECIFIED PARTS OF DIGESTIVE TRACT: Chronic | ICD-10-CM

## 2021-12-11 PROCEDURE — 73502 X-RAY EXAM HIP UNI 2-3 VIEWS: CPT | Mod: 26,LT

## 2021-12-11 PROCEDURE — 73502 X-RAY EXAM HIP UNI 2-3 VIEWS: CPT

## 2021-12-14 ENCOUNTER — LABORATORY RESULT (OUTPATIENT)
Age: 46
End: 2021-12-14

## 2021-12-14 ENCOUNTER — NON-APPOINTMENT (OUTPATIENT)
Age: 46
End: 2021-12-14

## 2021-12-15 ENCOUNTER — RESULT REVIEW (OUTPATIENT)
Age: 46
End: 2021-12-15

## 2021-12-15 ENCOUNTER — OUTPATIENT (OUTPATIENT)
Dept: OUTPATIENT SERVICES | Facility: HOSPITAL | Age: 46
LOS: 1 days | End: 2021-12-15
Payer: SELF-PAY

## 2021-12-15 ENCOUNTER — APPOINTMENT (OUTPATIENT)
Dept: INTERNAL MEDICINE | Facility: CLINIC | Age: 46
End: 2021-12-15

## 2021-12-15 VITALS
SYSTOLIC BLOOD PRESSURE: 124 MMHG | HEART RATE: 68 BPM | WEIGHT: 178 LBS | BODY MASS INDEX: 34.76 KG/M2 | DIASTOLIC BLOOD PRESSURE: 82 MMHG | RESPIRATION RATE: 14 BRPM

## 2021-12-15 DIAGNOSIS — Z98.51 TUBAL LIGATION STATUS: Chronic | ICD-10-CM

## 2021-12-15 DIAGNOSIS — Z90.49 ACQUIRED ABSENCE OF OTHER SPECIFIED PARTS OF DIGESTIVE TRACT: Chronic | ICD-10-CM

## 2021-12-15 DIAGNOSIS — Z86.39 PERSONAL HISTORY OF OTHER ENDOCRINE, NUTRITIONAL AND METABOLIC DISEASE: ICD-10-CM

## 2021-12-15 DIAGNOSIS — I10 ESSENTIAL (PRIMARY) HYPERTENSION: ICD-10-CM

## 2021-12-15 PROCEDURE — 36415 COLL VENOUS BLD VENIPUNCTURE: CPT

## 2021-12-15 PROCEDURE — G0463: CPT

## 2021-12-15 PROCEDURE — 83036 HEMOGLOBIN GLYCOSYLATED A1C: CPT

## 2021-12-16 LAB
A1C WITH ESTIMATED AVERAGE GLUCOSE RESULT: 6.6 % — HIGH (ref 4–5.6)
ESTIMATED AVERAGE GLUCOSE: 143 MG/DL — HIGH (ref 68–114)

## 2021-12-16 NOTE — ASSESSMENT
[FreeTextEntry1] : 46 y.o. F with DM2, obesity, hepatic steatosis, chronic joint pain, p/f follow up.\par \par # T2DM - well controlled \par - A1C continued to improve since starting medications and making lifestyle modifications \par - c/w Metformin 1000mg BID\par - Check A1C today, if still at goal, check q3M and f/u q3M \par - microalbuminuria and foot exam UTD. Will need optho exam Jan 2022 \par \par # Chronic left hip pain\par - Xray hip Dec 2021 unremarkable for arthritis, acute dislocation/fracture \par -suspect overuse injury/ bursitis\par - trial of diclofenac gel\par - encouraged pt to reconsider PT \par \par # Hepatic steatosis\par - RUQ abd in Aug 2021 showed fatty liver\par - AST ALT 40s range in Oct\par - Lipid panel wnl. c/w lipitor 40mg qD\par \par # HCM\par - C-scope 2021 with tubular adenoma, f/u in 2028\par - Mammo due Jan 2022 (referral provided), HPV pap due 2025\par - vaccines UTD

## 2021-12-16 NOTE — PLAN
[FreeTextEntry1] : \par \par RTC in 3 Months to monitor A1C and hip pain. \par \par Care discussed with Oksana Bond (PA-S) and Dr. Bauer. \par \par Marilou Hernandez, PGY2\par [facility]

## 2021-12-16 NOTE — HISTORY OF PRESENT ILLNESS
[Spouse] : spouse [Patient Declined  Services] : - None: Patient declined  services [FreeTextEntry1] : follow up [de-identified] : 46 y.o. F with DM2, obesity, hepatic steatosis, chronic joint pain, p/f follow up.\par \par  present and translated for visit. \par \par # T2DM\par - A1C continued to improve since starting medications and making lifestyle modifications \par - Metformin 1000mg BID, never misses dose \par - d/c glimepiride last visit in Oct given improved A1C 6.5%\par - still walking about 3 miles but less frequently given weather is colder and left hip pain exacerbated by walking\par \par # Chronic left hip pain\par - Xray hip Dec 2021 unremarkable for arthritis, acute dislocation/fracture \par - Pt reluctant to using any oral analgesics. She has a  h/o gastric ulcer, and actively avoids NSAIDs\par - has not been referred to PT but she states she will think about this option for now when offerred as she does not think she needs PT at this time \par - Tried lidocaine patch which has been helpful \par \par # Hepatic steatosis\par - RUQ abd in Aug 2021 showed fatty liver\par - AST ALT 40s range in Oct\par - Lipid panel wnl. On lipitor 40mg qD\par \par # HCM\par - C-scope 2021 with tubular adenoma, f/u in 2028\par - Mammo due Jan 2022, HPV pap due 2025\par - vaccines UTD

## 2021-12-21 DIAGNOSIS — Z86.39 PERSONAL HISTORY OF OTHER ENDOCRINE, NUTRITIONAL AND METABOLIC DISEASE: ICD-10-CM

## 2021-12-21 DIAGNOSIS — E66.9 OBESITY, UNSPECIFIED: ICD-10-CM

## 2022-02-03 ENCOUNTER — OUTPATIENT (OUTPATIENT)
Dept: OUTPATIENT SERVICES | Facility: HOSPITAL | Age: 47
LOS: 1 days | End: 2022-02-03
Payer: SELF-PAY

## 2022-02-03 ENCOUNTER — RESULT REVIEW (OUTPATIENT)
Age: 47
End: 2022-02-03

## 2022-02-03 ENCOUNTER — APPOINTMENT (OUTPATIENT)
Dept: MAMMOGRAPHY | Facility: IMAGING CENTER | Age: 47
End: 2022-02-03
Payer: COMMERCIAL

## 2022-02-03 DIAGNOSIS — Z98.51 TUBAL LIGATION STATUS: Chronic | ICD-10-CM

## 2022-02-03 DIAGNOSIS — Z90.49 ACQUIRED ABSENCE OF OTHER SPECIFIED PARTS OF DIGESTIVE TRACT: Chronic | ICD-10-CM

## 2022-02-03 DIAGNOSIS — Z00.8 ENCOUNTER FOR OTHER GENERAL EXAMINATION: ICD-10-CM

## 2022-02-03 PROCEDURE — 77063 BREAST TOMOSYNTHESIS BI: CPT

## 2022-02-03 PROCEDURE — 77067 SCR MAMMO BI INCL CAD: CPT | Mod: 26

## 2022-02-03 PROCEDURE — 77067 SCR MAMMO BI INCL CAD: CPT

## 2022-02-03 PROCEDURE — 77063 BREAST TOMOSYNTHESIS BI: CPT | Mod: 26

## 2022-03-09 ENCOUNTER — OUTPATIENT (OUTPATIENT)
Dept: OUTPATIENT SERVICES | Facility: HOSPITAL | Age: 47
LOS: 1 days | End: 2022-03-09
Payer: SELF-PAY

## 2022-03-09 ENCOUNTER — APPOINTMENT (OUTPATIENT)
Dept: INTERNAL MEDICINE | Facility: CLINIC | Age: 47
End: 2022-03-09
Payer: COMMERCIAL

## 2022-03-09 VITALS
HEART RATE: 88 BPM | OXYGEN SATURATION: 98 % | WEIGHT: 178 LBS | BODY MASS INDEX: 34.95 KG/M2 | DIASTOLIC BLOOD PRESSURE: 78 MMHG | HEIGHT: 60 IN | SYSTOLIC BLOOD PRESSURE: 120 MMHG

## 2022-03-09 DIAGNOSIS — I10 ESSENTIAL (PRIMARY) HYPERTENSION: ICD-10-CM

## 2022-03-09 DIAGNOSIS — Z98.51 TUBAL LIGATION STATUS: Chronic | ICD-10-CM

## 2022-03-09 DIAGNOSIS — E66.9 OBESITY, UNSPECIFIED: ICD-10-CM

## 2022-03-09 DIAGNOSIS — Z90.49 ACQUIRED ABSENCE OF OTHER SPECIFIED PARTS OF DIGESTIVE TRACT: Chronic | ICD-10-CM

## 2022-03-09 PROCEDURE — G0463: CPT

## 2022-03-09 PROCEDURE — ZZZZZ: CPT

## 2022-03-09 PROCEDURE — T1013: CPT

## 2022-03-09 RX ORDER — POLYETHYLENE GLYCOL 3350 17 G/17G
17 POWDER, FOR SOLUTION ORAL TWICE DAILY
Qty: 60 | Refills: 2 | Status: DISCONTINUED | COMMUNITY
Start: 2021-07-30 | End: 2022-03-09

## 2022-03-09 NOTE — PHYSICAL EXAM
[Normal] : normal rate, regular rhythm, normal S1 and S2 and no murmur heard [Pedal Pulses Present] : the pedal pulses are present [No Edema] : there was no peripheral edema [Soft] : abdomen soft [Non Tender] : non-tender [Normal Bowel Sounds] : normal bowel sounds [No Joint Swelling] : no joint swelling [No Rash] : no rash

## 2022-03-14 NOTE — HISTORY OF PRESENT ILLNESS
[Spouse] : spouse [[employer] ] : provided by [employer]   [Time Spent: ____ minutes] : Total time spent using  services: [unfilled] minutes. The patient's primary language is not English thus required  services. [FreeTextEntry1] : 46 F presenting to clinic for follow up and for results of A1C last visit  [Interpreters_IDNumber] : [id number] [Interpreters_FullName] : Kai [TWNoteComboBox1] : Mauritian [de-identified] : 46 F pmh DMII Obesity, hepatic steatosis, chronic joint pain, Chronic hip pain, presenting to clinic for follow up requesting discussion of  result of A1C lab work done at prior visit.Patient denies any current complaints, increase in pain, or any new symptoms she would like to discuss today.  \par \par DMII: Currently taking Metformin 1000 mg BID. Patient reports daily walking for exercise reports she has some difficulty in walking for long periods of time due to her Left hip pain but tries to exercise as much as she can. Denies any change in diet since her last visit. \par Chronic L Hip pain: Patient recently went for a X ray of the hip awaiting results for pain management she takes Tylenol PRN, has used lidocaine patch and diclofenac gel in the past which provides relief. Does not currently have any increase in pain patient feels that it is well controlled. Of note, can not take NSAIDs/ibuprofen for pain management due to history of GI ulcer \par \par Hepatic Steatosis: Patient was previously prescribed Atorvastatin 40 mg but denies that they have been taking it unaware that they had been prescribed this medication.

## 2022-03-14 NOTE — REVIEW OF SYSTEMS
[Joint Pain] : joint pain [Negative] : Neurological [Fever] : no fever [Chills] : no chills [Fatigue] : no fatigue [Chest Pain] : no chest pain [Palpitations] : no palpitations [Lower Ext Edema] : no lower extremity edema [Shortness Of Breath] : no shortness of breath [Cough] : no cough [Abdominal Pain] : no abdominal pain [Nausea] : no nausea [Diarrhea] : diarrhea [Vomiting] : no vomiting [Itching] : no itching [Nail Changes] : no nail changes [Skin Rash] : no skin rash [FreeTextEntry9] : L Hip pain

## 2022-03-14 NOTE — PLAN
[FreeTextEntry1] : \par 46 F, Citizen of [place] speaking, with hx of T2DM, (A1C 6.6), obesity, hepatic steatosis, chronic joint pain, presenting with  for follow up.\par \par # DMII \par - HgA1C Last visit 6.6 Plan to repeat today, discussed with patient results patient to continue with daily exercise as tolerated\par - continue with Metformin 1000 mg BID\par - Patient to see our Nutritionist next visit for continued dietary education. \par \par # Chronic L Hip pain\par - Patient to continue Lidocaine patch, and diclofenac gel as needed. \par - Patient can continue to take Tylenol PRN but cautioned on frequent use in the setting of a history of Hepatic steatosis. \par - Unable to use NSAIDs/iburporfen for pain control due to history of GI ulcer. \par \par # Hepatic Steatosis\par - repeat CMP today to assess transaminases will discuss with patient results of lab work. \par - Repeat Lipid Panel today. patient is to take Atorvastatin 40 mg as prescribed will discuss patient restarting statin with results of blood work. \par \par # HCM\par - PCV 20: Offered to patient last Pneumococcal 23 in 2018 patient now due for PCV 20 declined this visit\par - COVID: moderna x 2 denied receiving booster \par - Flu: UTD\par - C-scope/Endoscopy: Received july 2021\par - Mammogram: UTD \par - Annual GYN: patient due to return to GYN for annual visit \par \par RTC in 3 months\par \par Note written by JULIO Zamarripa student\par \par Discussed with Dr. Bauer\par \par Brayan Johnston, PGY-2\par [facility]

## 2022-03-14 NOTE — HEALTH RISK ASSESSMENT
[Never] : Never [No] : No [No falls in past year] : Patient reported no falls in the past year [de-identified] : Daily walking

## 2022-03-18 DIAGNOSIS — E11.9 TYPE 2 DIABETES MELLITUS WITHOUT COMPLICATIONS: ICD-10-CM

## 2022-03-31 ENCOUNTER — APPOINTMENT (OUTPATIENT)
Dept: INTERNAL MEDICINE | Facility: CLINIC | Age: 47
End: 2022-03-31
Payer: COMMERCIAL

## 2022-03-31 ENCOUNTER — OUTPATIENT (OUTPATIENT)
Dept: OUTPATIENT SERVICES | Facility: HOSPITAL | Age: 47
LOS: 1 days | End: 2022-03-31
Payer: SELF-PAY

## 2022-03-31 VITALS
DIASTOLIC BLOOD PRESSURE: 78 MMHG | SYSTOLIC BLOOD PRESSURE: 118 MMHG | HEART RATE: 81 BPM | BODY MASS INDEX: 34.95 KG/M2 | OXYGEN SATURATION: 98 % | HEIGHT: 60 IN | WEIGHT: 178 LBS

## 2022-03-31 DIAGNOSIS — M25.552 PAIN IN LEFT HIP: ICD-10-CM

## 2022-03-31 DIAGNOSIS — I10 ESSENTIAL (PRIMARY) HYPERTENSION: ICD-10-CM

## 2022-03-31 DIAGNOSIS — Z90.49 ACQUIRED ABSENCE OF OTHER SPECIFIED PARTS OF DIGESTIVE TRACT: Chronic | ICD-10-CM

## 2022-03-31 DIAGNOSIS — Z98.51 TUBAL LIGATION STATUS: Chronic | ICD-10-CM

## 2022-03-31 LAB
ALBUMIN SERPL ELPH-MCNC: 4.2 G/DL
ALP BLD-CCNC: 159 U/L
ALT SERPL-CCNC: 58 U/L
ANION GAP SERPL CALC-SCNC: 12 MMOL/L
AST SERPL-CCNC: 57 U/L
BILIRUB SERPL-MCNC: 0.2 MG/DL
BUN SERPL-MCNC: 14 MG/DL
CALCIUM SERPL-MCNC: 9.6 MG/DL
CHLORIDE SERPL-SCNC: 102 MMOL/L
CHOLEST SERPL-MCNC: 161 MG/DL
CO2 SERPL-SCNC: 24 MMOL/L
CREAT SERPL-MCNC: 0.56 MG/DL
EGFR: 113 ML/MIN/1.73M2
ESTIMATED AVERAGE GLUCOSE: 203 MG/DL
GLUCOSE SERPL-MCNC: 221 MG/DL
HBA1C MFR BLD HPLC: 8.7 %
HDLC SERPL-MCNC: 58 MG/DL
LDLC SERPL CALC-MCNC: 75 MG/DL
NONHDLC SERPL-MCNC: 103 MG/DL
POTASSIUM SERPL-SCNC: 5.3 MMOL/L
PROT SERPL-MCNC: 7 G/DL
SODIUM SERPL-SCNC: 138 MMOL/L
TRIGL SERPL-MCNC: 141 MG/DL

## 2022-03-31 PROCEDURE — G0463: CPT

## 2022-03-31 PROCEDURE — ZZZZZ: CPT

## 2022-04-03 PROBLEM — M25.552 LEFT HIP PAIN: Status: ACTIVE | Noted: 2021-09-01

## 2022-04-03 NOTE — PHYSICAL EXAM
[Soft] : abdomen soft [Normal] : normal rate, regular rhythm, normal S1 and S2 and no murmur heard [Non Tender] : non-tender [Non-distended] : non-distended [Normal Bowel Sounds] : normal bowel sounds [No Joint Swelling] : no joint swelling [Grossly Normal Strength/Tone] : grossly normal strength/tone [de-identified] : B/l hand  strength 5/5. B/l fingers flexion, lateral medial deviation strength normal and intact.

## 2022-04-03 NOTE — ASSESSMENT
[FreeTextEntry1] : 46 y.o. Mauritian-speaking F, with PMH of T2DM, obesity, hepatic steatosis, chronic left hip pain, p/f follow up, found to have rise in A1C likely associated with decreased physical activity.

## 2022-04-03 NOTE — PLAN
[FreeTextEntry1] : # T2DM - uncontrolled\par - A1C 6.6 12/2021--> 8.2 3/2022\par - C/w Metformin 1g BID\par - Start Trulicity 0.75 once weekly ([facility] approval obtained)\par - F/u in 5 wks to monitor glycemic control \par - Needs referral to ophtho next visit. No microalbuminuria (07/2021). Foot exam wnl (07/2021)\par \par # Left index finger pain \par - Suspect overuse? No red flag signs of neuro/muscular/vascular compromise. Hx inconsistent with OA or RA either\par - Monitor for now. Advised pt to pay attention to activity or movements that trigger pain. f/u next visit. \par \par # Left hip pain \par - Chronic pain previously s/p left hip Xray without acute findings or OA \par - Tylenol as needed\par - C/w PT. Referral re-written as per pt's request \par \par # Dyslipidemia \par - C/w Atorvastatin 40mg qD \par - 3/22 LDL 75 near goal \par \par # HCM \par - Can get PCV 20; discuss next visit\par - COVID - needs booster, will f/u next visit\par - CRC July 2021 tubular adenoma, repeat in 2028 \par - Mammo Feb 2022 BIRADS2, next due 2024  \par - PapHPV due 2025

## 2022-04-03 NOTE — HISTORY OF PRESENT ILLNESS
[Spouse] : spouse [Patient Declined  Services] : - None: Patient declined  services [FreeTextEntry1] : Follow up  [de-identified] : 46 y.o. Hungarian-speaking F, with PMH of T2DM, obesity, hepatic steatosis, chronic left hip pain, p/f follow up of glycemic control.  accompanied pt today, and as per pt's request, is facilitating with interpretation. Pt was seen by Dr. Johnston 3/9/22 for follow up also, labs were obtained few days prior to today's visit, A1C increased from 6.6 to 8.2.  \par \par # T2DM (A1C 6.6 --> 8.2 )\par - Metformin 1g BID. Previously well controlled and taken off of sulfonylurea \par - Pt reports decreasing amount of walking given cold weather in past 3 months of winter \par - Diet remains same as before, eating healthy options mostly vegetables, small amounts of carbs. Makes her meals majority of time.   \par \par # Left index finger pain\par - Pt is left handed. Reports few week hx of left index finger pain. Denies stiffness, swelling, erythema. Denies prior similar hx. Was hoping to check "arthritis" labs \par - Not taking any pain medications, denies weakness numbness tingling \par \par # Dyslipidemia \par - On Atorvastatin 40mg qD \par - 3/22 Lipid profile wnl; LDL 75

## 2022-04-11 DIAGNOSIS — M25.552 PAIN IN LEFT HIP: ICD-10-CM

## 2022-04-11 DIAGNOSIS — E11.9 TYPE 2 DIABETES MELLITUS WITHOUT COMPLICATIONS: ICD-10-CM

## 2022-04-18 ENCOUNTER — RX RENEWAL (OUTPATIENT)
Age: 47
End: 2022-04-18

## 2022-05-04 ENCOUNTER — MED ADMIN CHARGE (OUTPATIENT)
Age: 47
End: 2022-05-04

## 2022-05-04 ENCOUNTER — APPOINTMENT (OUTPATIENT)
Dept: INTERNAL MEDICINE | Facility: CLINIC | Age: 47
End: 2022-05-04
Payer: COMMERCIAL

## 2022-05-04 ENCOUNTER — OUTPATIENT (OUTPATIENT)
Dept: OUTPATIENT SERVICES | Facility: HOSPITAL | Age: 47
LOS: 1 days | End: 2022-05-04
Payer: SELF-PAY

## 2022-05-04 ENCOUNTER — RESULT CHARGE (OUTPATIENT)
Age: 47
End: 2022-05-04

## 2022-05-04 VITALS
DIASTOLIC BLOOD PRESSURE: 80 MMHG | BODY MASS INDEX: 31.54 KG/M2 | WEIGHT: 178 LBS | SYSTOLIC BLOOD PRESSURE: 116 MMHG | HEART RATE: 89 BPM | OXYGEN SATURATION: 98 % | HEIGHT: 63 IN

## 2022-05-04 DIAGNOSIS — I10 ESSENTIAL (PRIMARY) HYPERTENSION: ICD-10-CM

## 2022-05-04 DIAGNOSIS — Z98.51 TUBAL LIGATION STATUS: Chronic | ICD-10-CM

## 2022-05-04 DIAGNOSIS — Z90.49 ACQUIRED ABSENCE OF OTHER SPECIFIED PARTS OF DIGESTIVE TRACT: Chronic | ICD-10-CM

## 2022-05-04 DIAGNOSIS — E11.9 TYPE 2 DIABETES MELLITUS WITHOUT COMPLICATIONS: ICD-10-CM

## 2022-05-04 DIAGNOSIS — Z23 ENCOUNTER FOR IMMUNIZATION: ICD-10-CM

## 2022-05-04 LAB — HBA1C MFR BLD HPLC: 7.6

## 2022-05-04 PROCEDURE — 83036 HEMOGLOBIN GLYCOSYLATED A1C: CPT

## 2022-05-04 PROCEDURE — ZZZZZ: CPT

## 2022-05-04 PROCEDURE — G0463: CPT | Mod: 25

## 2022-05-05 PROBLEM — Z23 ENCOUNTER FOR IMMUNIZATION: Status: ACTIVE | Noted: 2020-10-15

## 2022-05-05 NOTE — PHYSICAL EXAM
[Normal] : normal rate, regular rhythm, normal S1 and S2 and no murmur heard [Soft] : abdomen soft [Non Tender] : non-tender [Non-distended] : non-distended [Normal Bowel Sounds] : normal bowel sounds

## 2022-05-11 NOTE — ASSESSMENT
[FreeTextEntry1] : 46 y.o. Central African-speaking F with PMH of T2DM, obesity, hepatic steatosis, chronic let hip pain, presents for follow up of glycemic control since starting Trulicity.

## 2022-05-11 NOTE — REVIEW OF SYSTEMS
[Nausea] : nausea [Negative] : Respiratory [Abdominal Pain] : no abdominal pain [Vomiting] : no vomiting [Heartburn] : no heartburn

## 2022-05-11 NOTE — HISTORY OF PRESENT ILLNESS
[Spouse] : spouse [Patient Declined  Services] : - None: Patient declined  services [FreeTextEntry1] : Follow up [de-identified] : 46 y.o. Indonesian-speaking F with PMH of T2DM, obesity, hepatic steatosis, chronic let hip pain, presents for follow up. Last seen in Mar and found to have increasing A1C, started on Trulicity. She presents today to discuss glycemic control. She reports feeling well, has been walking more as weather is warmer. She reports some nausea since starting Trulicity, but no vomiting or weight loss. \par \par # T2DM \par -A1C increased from 6.6 to 8.2 in 3 months, due to lack of physical activity \par -Started on Trulicity 0.75 in March \par \par # Dyslipidemia \par -Lipitor 40mg qD. 3/2022 LDL 75  \par \par # HCM \par -Needs PCV 20\par -COVID booster due  \par -CRC due 2028. C-scope July 2021 with tubular adenoma  \par -Mammo Feb 2022 BIRADS2; next 2024 \par -PAP/HPV due 2025

## 2022-05-11 NOTE — PLAN
[FreeTextEntry1] : =================================================================================\par \par # T2DM \par -A1C improved from 8.7 to 7.6, prompting start of Trulicity 0.75 q/Wk, which is tolerated with some nausea \par -Will c/w current dose. Encouraged pt to keep up with daily walks and diet\par -F/u in 5 wks, discuss increasing dose to 1.5 next visit\par -Give ophtho referral next visit \par \par # Dyslipidemia \par -Lipitor 40mg qD. 3/2022 LDL 75  \par \par # HCM \par -PCV20 given today\par -COVID booster due; pt and  hesitant as many others have caught COVID despite booster. Emphasized purpose of vaccine is to prevent severe illness once infected.   \par -CRC due 2028. C-scope July 2021 with tubular adenoma  \par -Mammo Feb 2022 BIRADS2; next due 2024 \par -PAP/HPV due 2025  \par \par Care discussed with Dr. Bauer\par \par RTC in 5 weeks to consider uptitrating Trulicity \par \par Marilou Hernandez, PGY2\par [facility]\par \par

## 2022-06-29 ENCOUNTER — NON-APPOINTMENT (OUTPATIENT)
Age: 47
End: 2022-06-29

## 2022-08-17 ENCOUNTER — APPOINTMENT (OUTPATIENT)
Dept: INTERNAL MEDICINE | Facility: CLINIC | Age: 47
End: 2022-08-17

## 2022-08-17 ENCOUNTER — RESULT REVIEW (OUTPATIENT)
Age: 47
End: 2022-08-17

## 2022-08-17 ENCOUNTER — OUTPATIENT (OUTPATIENT)
Dept: OUTPATIENT SERVICES | Facility: HOSPITAL | Age: 47
LOS: 1 days | End: 2022-08-17
Payer: SELF-PAY

## 2022-08-17 VITALS
BODY MASS INDEX: 34.95 KG/M2 | SYSTOLIC BLOOD PRESSURE: 124 MMHG | DIASTOLIC BLOOD PRESSURE: 82 MMHG | HEIGHT: 60 IN | WEIGHT: 178 LBS | OXYGEN SATURATION: 98 % | HEART RATE: 75 BPM

## 2022-08-17 DIAGNOSIS — Z90.49 ACQUIRED ABSENCE OF OTHER SPECIFIED PARTS OF DIGESTIVE TRACT: Chronic | ICD-10-CM

## 2022-08-17 DIAGNOSIS — I10 ESSENTIAL (PRIMARY) HYPERTENSION: ICD-10-CM

## 2022-08-17 DIAGNOSIS — R10.2 PELVIC AND PERINEAL PAIN: ICD-10-CM

## 2022-08-17 DIAGNOSIS — Z98.51 TUBAL LIGATION STATUS: Chronic | ICD-10-CM

## 2022-08-17 DIAGNOSIS — E66.9 OBESITY, UNSPECIFIED: ICD-10-CM

## 2022-08-17 DIAGNOSIS — N81.11 CYSTOCELE, MIDLINE: ICD-10-CM

## 2022-08-17 PROCEDURE — 85025 COMPLETE CBC W/AUTO DIFF WBC: CPT

## 2022-08-17 PROCEDURE — 86803 HEPATITIS C AB TEST: CPT

## 2022-08-17 PROCEDURE — 86706 HEP B SURFACE ANTIBODY: CPT

## 2022-08-17 PROCEDURE — G0463: CPT

## 2022-08-17 PROCEDURE — ZZZZZ: CPT

## 2022-08-17 PROCEDURE — 86704 HEP B CORE ANTIBODY TOTAL: CPT

## 2022-08-17 PROCEDURE — 82043 UR ALBUMIN QUANTITATIVE: CPT

## 2022-08-19 PROBLEM — E66.9 OBESITY, CLASS II, BMI 35-39.9: Status: ACTIVE | Noted: 2021-06-15

## 2022-08-19 PROBLEM — N81.11 MIDLINE CYSTOCELE: Status: ACTIVE | Noted: 2021-01-22

## 2022-08-19 LAB
APPEARANCE: CLEAR
BASOPHILS # BLD AUTO: 0.02 K/UL
BASOPHILS NFR BLD AUTO: 0.3 %
BILIRUBIN URINE: NEGATIVE
BLOOD URINE: NEGATIVE
COLOR: NORMAL
CREAT SPEC-SCNC: 51 MG/DL
EOSINOPHIL # BLD AUTO: 0.28 K/UL
EOSINOPHIL NFR BLD AUTO: 4.3 %
GLUCOSE QUALITATIVE U: NEGATIVE
HBV CORE IGG+IGM SER QL: NONREACTIVE
HBV SURFACE AB SER QL: NONREACTIVE
HCT VFR BLD CALC: 38.8 %
HCV AB SER QL: NONREACTIVE
HCV S/CO RATIO: 0.22 S/CO
HGB BLD-MCNC: 12.8 G/DL
IMM GRANULOCYTES NFR BLD AUTO: 0.2 %
KETONES URINE: NEGATIVE
LEUKOCYTE ESTERASE URINE: NEGATIVE
LYMPHOCYTES # BLD AUTO: 3.12 K/UL
LYMPHOCYTES NFR BLD AUTO: 47.9 %
MAN DIFF?: NORMAL
MCHC RBC-ENTMCNC: 29.8 PG
MCHC RBC-ENTMCNC: 33 GM/DL
MCV RBC AUTO: 90.2 FL
MICROALBUMIN 24H UR DL<=1MG/L-MCNC: <1.2 MG/DL
MICROALBUMIN/CREAT 24H UR-RTO: NORMAL MG/G
MONOCYTES # BLD AUTO: 0.37 K/UL
MONOCYTES NFR BLD AUTO: 5.7 %
NEUTROPHILS # BLD AUTO: 2.71 K/UL
NEUTROPHILS NFR BLD AUTO: 41.6 %
NITRITE URINE: NEGATIVE
PH URINE: 5.5
PLATELET # BLD AUTO: 160 K/UL
PROTEIN URINE: NEGATIVE
RBC # BLD: 4.3 M/UL
RBC # FLD: 13.9 %
SPECIFIC GRAVITY URINE: 1.01
UROBILINOGEN URINE: NORMAL
WBC # FLD AUTO: 6.51 K/UL

## 2022-08-24 NOTE — ASSESSMENT
[FreeTextEntry1] : 46 y.o. Micronesian-speaking F with PMH of T2DM, obesity, hepatic steatosis, chronic let hip pain, presents for follow up of glucose control and reports pelvic/ LLQ abd pain possibly 2/2 cystocele vs UTI vs other ovarian etiology. \par

## 2022-08-24 NOTE — PLAN
[FreeTextEntry1] : # LLQ/pelvic pain\par - DDx cystocele vs UTI vs diverticulitis vs ovarian cyst \par - Pt with known cystocele from UroGYN eval; cystocele possibly contributing to pelvic pressure.Pt to f/u with UroGYN\par - UA UCx \par - C-scope 2021 without diverticulosis \par - Pelvic US\par \par # T2DM  well controlled\par - A1C 7.6 in May, 5.9 today \par - C/w Trulicity 0.75 q/Wk, which is tolerated with some nausea  \par - ophtho referral given\par - Urine screening today\par - foot exam next visit\par  \par # Obesity class 1\par - Seen by nutritionist in past; has not lost weight for last year despite small appetite and dietary changes \par - Referral to weight management\par - Advised pt to start keeping food log \par \par # Dyslipidemia  \par - C/w Lipitor 40mg qD. 3/2022 LDL 75  \par   \par # HCM  \par -COVID booster due; pt and  hesitant as many others have caught COVID despite booster. Emphasized purpose of vaccine is to prevent severe illness once infected.  \par -CRC due 2028. C-scope July 2021 with tubular adenoma  \par -Mammo Feb 2022 BIRADS2; next due 2024  \par -PAP/HPV due 2025  \par -All other vaccines UTD \par \par ============================================================================================\par RTC in 10 wks\par \par Care discussed with Dr. Bauer\par \par Marilou Hernandez, PGY3\par [facility]\par

## 2022-08-24 NOTE — PHYSICAL EXAM
[Normal] : normal rate, regular rhythm, normal S1 and S2 and no murmur heard [Soft] : abdomen soft [No Masses] : no abdominal mass palpated [Normal Bowel Sounds] : normal bowel sounds [de-identified] : Tenderness in LLQ and suprapubic region. No rebound

## 2022-08-24 NOTE — HISTORY OF PRESENT ILLNESS
[de-identified] : 46 y.o. Armenian-speaking F with PMH of T2DM, obesity, hepatic steatosis, chronic let hip pain, presents for follow up of glucose control and reports new lower abdominal and pelvic pain. \par \par Lower abd/pelvic pain\par - 2 months, sharp, comes and goes, non radiating, last 1-2 minutes \par - severity about a 4, does not take any medications, has not noted any alleviating or aggravating factors \par - associated with malodorous urine, but no burning, no blood in urine\par - denies urinary retention\par - does have urinary leakage when bearing down with heavy lifting \par - feels constipated, BM q 3-4 days \par \par # T2DM  \par - Metformin 1g BID and Trulicity 0.75 q/Wk, still having some nausea and does not have a good appetite \par

## 2022-08-24 NOTE — REVIEW OF SYSTEMS
[Abdominal Pain] : abdominal pain [Nausea] : nausea [Incontinence] : incontinence [Negative] : Respiratory [Constipation] : no constipation [Diarrhea] : diarrhea [Vomiting] : no vomiting [Heartburn] : no heartburn [Dysuria] : no dysuria [Hematuria] : no hematuria [Frequency] : no frequency [Vaginal Discharge] : no vaginal discharge

## 2022-08-31 DIAGNOSIS — E11.9 TYPE 2 DIABETES MELLITUS WITHOUT COMPLICATIONS: ICD-10-CM

## 2022-08-31 DIAGNOSIS — E66.9 OBESITY, UNSPECIFIED: ICD-10-CM

## 2022-08-31 DIAGNOSIS — N81.11 CYSTOCELE, MIDLINE: ICD-10-CM

## 2022-09-13 ENCOUNTER — APPOINTMENT (OUTPATIENT)
Dept: ULTRASOUND IMAGING | Facility: CLINIC | Age: 47
End: 2022-09-13

## 2022-09-13 ENCOUNTER — OUTPATIENT (OUTPATIENT)
Dept: OUTPATIENT SERVICES | Facility: HOSPITAL | Age: 47
LOS: 1 days | End: 2022-09-13
Payer: COMMERCIAL

## 2022-09-13 DIAGNOSIS — Z98.51 TUBAL LIGATION STATUS: Chronic | ICD-10-CM

## 2022-09-13 DIAGNOSIS — Z90.49 ACQUIRED ABSENCE OF OTHER SPECIFIED PARTS OF DIGESTIVE TRACT: Chronic | ICD-10-CM

## 2022-09-13 DIAGNOSIS — R10.2 PELVIC AND PERINEAL PAIN: ICD-10-CM

## 2022-09-13 PROCEDURE — 76856 US EXAM PELVIC COMPLETE: CPT

## 2022-09-13 PROCEDURE — 76856 US EXAM PELVIC COMPLETE: CPT | Mod: 26

## 2022-09-15 ENCOUNTER — NON-APPOINTMENT (OUTPATIENT)
Age: 47
End: 2022-09-15

## 2022-10-04 ENCOUNTER — APPOINTMENT (OUTPATIENT)
Dept: INTERNAL MEDICINE | Facility: CLINIC | Age: 47
End: 2022-10-04

## 2022-10-04 ENCOUNTER — OUTPATIENT (OUTPATIENT)
Dept: OUTPATIENT SERVICES | Facility: HOSPITAL | Age: 47
LOS: 1 days | End: 2022-10-04
Payer: SELF-PAY

## 2022-10-04 VITALS
HEART RATE: 78 BPM | OXYGEN SATURATION: 98 % | HEIGHT: 60 IN | BODY MASS INDEX: 34.95 KG/M2 | SYSTOLIC BLOOD PRESSURE: 120 MMHG | DIASTOLIC BLOOD PRESSURE: 70 MMHG | WEIGHT: 178 LBS

## 2022-10-04 DIAGNOSIS — I10 ESSENTIAL (PRIMARY) HYPERTENSION: ICD-10-CM

## 2022-10-04 DIAGNOSIS — Z90.49 ACQUIRED ABSENCE OF OTHER SPECIFIED PARTS OF DIGESTIVE TRACT: Chronic | ICD-10-CM

## 2022-10-04 DIAGNOSIS — Z98.51 TUBAL LIGATION STATUS: Chronic | ICD-10-CM

## 2022-10-04 LAB
GLUCOSE BLDC GLUCOMTR-MCNC: 99
HBA1C MFR BLD HPLC: 7

## 2022-10-04 PROCEDURE — 82948 REAGENT STRIP/BLOOD GLUCOSE: CPT

## 2022-10-04 PROCEDURE — G0463: CPT | Mod: 25

## 2022-10-04 PROCEDURE — 83036 HEMOGLOBIN GLYCOSYLATED A1C: CPT

## 2022-10-04 PROCEDURE — ZZZZZ: CPT

## 2022-10-04 RX ORDER — MAGNESIUM 200 MG
1000 TABLET ORAL
Qty: 90 | Refills: 3 | Status: ACTIVE | COMMUNITY
Start: 2022-10-04 | End: 1900-01-01

## 2022-10-04 NOTE — ASSESSMENT
[FreeTextEntry1] : 46 yo/F w/ PMHx diabetes, hepatic steatosis, L hip pain presents to establish care.\par \par #Weight management:\par - POC A1c 7%\par - counseled pt on increasing fiber intake, decreased carb/protein for decreased insulin resistance\par - c/w metformin 1g BID\par - will increase Truliity to 1.5mg weekly injections- counseled pt on nausea that should be temporarily associated with the increase\par \par RTC 5-6 weeks to monitor weight progress and whether pt tolerated increase in medication

## 2022-10-04 NOTE — END OF VISIT
[] : Resident [FreeTextEntry3] : whole foods, plant-forward eating\par increase trulicity to 1.5mg\par cont metformin for now\par rtc in 5-6 weeks. [Time Spent: ___ minutes] : I have spent [unfilled] minutes of time on the encounter.

## 2022-10-04 NOTE — REVIEW OF SYSTEMS
[Negative] : Respiratory [MED-ROS-Cardiovas-FT] : palpitations occ at rest [MED-ROS-Gastro-FT] : constipation [MED-ROS-Ramy-FT] : left hip pain

## 2022-10-04 NOTE — HISTORY OF PRESENT ILLNESS
[Improved Health] : Improved health [Quality of Life] : Quality of life [I usually sleep 6-8 hours] : I usually sleep 6-8 hours [Breakfast] : breakfast [Dinner] : dinner [Don't snack] : don't snack [Self] : self [Walking] : walking [T2DM] : T2DM [Metformin] : metformin  [Trulicity] : Trulicity [Nausea/vomiting] : nausea/vomiting  [FreeTextEntry2] : 165 [FreeTextEntry3] : 185 [] : No [FreeTextEntry1] : 46 yo/F w/ PMHx diabetes, hepatic steatosis, L hip pain presents for establishment of care at weight management clinic. Currently still taking Metformin 1g BID and Trulicity (started 3 months ago) with side effect of nausea and early satiety. \par \par \par Weight: when dieting, she loses weight but then gains it right back; diets include eating only vegetables (led to colitis); tries to lose weight once a year\par \par Family hx: grandmother DM, uncle DM, mom passed away from liver cancer \par \par Diet: eats two times a day: breakfast (coffee, eggs) and dinner (rice, chicken/meat, veggies) -- increased fullness/satiety since starting Trulicity, however, no associated weight loss. Always never ate lunch (no appetite). \par \par Social: lives together, takes care of the grandkids; no smoking, no Etoh, no recreational drug use \par \par Physical activity: doesn't walk at all during the weekday, tries to walk 1x/week; used to walk 3mi/d without pain before taking care of the grandkids\par \par Sleep: sleeps late 1am-8am, sometimes feels tired throughout the day but never naps; never tried sleeping aids \par \par Stressors: no stressors \par \par Prior weight management seen in past and lost 25lbs: was counseled to eat 5 meals a day then. Last seen ten years ago.

## 2022-10-06 DIAGNOSIS — E66.01 MORBID (SEVERE) OBESITY DUE TO EXCESS CALORIES: ICD-10-CM

## 2022-10-06 DIAGNOSIS — E11.9 TYPE 2 DIABETES MELLITUS WITHOUT COMPLICATIONS: ICD-10-CM

## 2022-11-28 ENCOUNTER — APPOINTMENT (OUTPATIENT)
Dept: INTERNAL MEDICINE | Facility: CLINIC | Age: 47
End: 2022-11-28

## 2023-01-27 NOTE — ED PROVIDER NOTE - CROS ED ROS STATEMENT
Received care of patient resting in bed. Patient is alert and oriented and able to make her needs known. Patient has a blunt affect with congruent mood, rating her depression and anxiety at 10/10. She is currently denying pain. She endorses wanting to hurt self but feels like she will be safe in the hospital. Patient is resting in bed currently. Monitoring to continue for safety, support and situation. all other ROS negative except as per HPI

## 2023-02-08 ENCOUNTER — RESULT REVIEW (OUTPATIENT)
Age: 48
End: 2023-02-08

## 2023-02-08 ENCOUNTER — OUTPATIENT (OUTPATIENT)
Dept: OUTPATIENT SERVICES | Facility: HOSPITAL | Age: 48
LOS: 1 days | End: 2023-02-08
Payer: SELF-PAY

## 2023-02-08 ENCOUNTER — APPOINTMENT (OUTPATIENT)
Dept: INTERNAL MEDICINE | Facility: CLINIC | Age: 48
End: 2023-02-08
Payer: COMMERCIAL

## 2023-02-08 VITALS
DIASTOLIC BLOOD PRESSURE: 78 MMHG | HEIGHT: 60 IN | HEART RATE: 86 BPM | SYSTOLIC BLOOD PRESSURE: 110 MMHG | OXYGEN SATURATION: 98 % | BODY MASS INDEX: 34.75 KG/M2 | WEIGHT: 177 LBS

## 2023-02-08 DIAGNOSIS — I10 ESSENTIAL (PRIMARY) HYPERTENSION: ICD-10-CM

## 2023-02-08 DIAGNOSIS — Z98.51 TUBAL LIGATION STATUS: Chronic | ICD-10-CM

## 2023-02-08 DIAGNOSIS — Z90.49 ACQUIRED ABSENCE OF OTHER SPECIFIED PARTS OF DIGESTIVE TRACT: Chronic | ICD-10-CM

## 2023-02-08 LAB — HBA1C MFR BLD HPLC: 5.9

## 2023-02-08 PROCEDURE — ZZZZZ: CPT

## 2023-02-08 PROCEDURE — 83036 HEMOGLOBIN GLYCOSYLATED A1C: CPT

## 2023-02-08 PROCEDURE — G0463: CPT | Mod: 25

## 2023-02-08 NOTE — PHYSICAL EXAM
[No Lymphadenopathy] : no lymphadenopathy [Thyroid Normal, No Nodules] : the thyroid was normal and there were no nodules present [Normal] : normal rate, regular rhythm, normal S1 and S2 and no murmur heard [Soft] : abdomen soft [Non Tender] : non-tender [Non-distended] : non-distended [Normal Bowel Sounds] : normal bowel sounds [Comprehensive Foot Exam Normal] : Right and left foot were examined and both feet are normal. No ulcers in either foot. Toes are normal and with full ROM.  Normal tactile sensation with monofilament testing throughout both feet

## 2023-02-15 NOTE — HISTORY OF PRESENT ILLNESS
[Spouse] : spouse [FreeTextEntry1] : follow up  [de-identified] : 47F with PMH of T2DM, obesity, hepatic steatosis, chronic let hip pain, presents for CPE. \par \par She reports h/o COVID about 3 months ago with symptoms of fatigue, myalgia. She denies fevers and was not treated with any medications. She otherwise feels well, denies new concerns. She is here today with her  who is helping with translation. She wants to know her sugar levels, and is hoping to get referrals for mammogram and ophthalmology for routine screening. She was seen by weight management and trulicity was increased to 1.5mg weekly. She has noticed much decreased appetite with this dose.

## 2023-02-15 NOTE — ASSESSMENT
[FreeTextEntry1] : 47F with PMH of T2DM, obesity, hepatic steatosis, chronic let hip pain, presents for CPE.

## 2023-02-15 NOTE — PLAN
[FreeTextEntry1] : # T2DM well controlled \par - A1C 5.9 today \par - C/w Trulicity 1.5 q/Wk & metformin 1g BID  \par - ophtho referral given  \par - Urine screen neg Aug 22 \par - foot exam wnl today \par \par # Obesity class 1 \par - Following weight management\par - Trulicity and metformin as above\par - Pt to start walking on treadmill 3x a week \par \par # Hepatic steatosis \par - Found on abd u/s, which was obtained due to transaminitis. Repeat CMP today \par - FIB 4 score 2.3 ; intermediate risk\par - Referral to hepatology provided\par - Fibroscan ordered\par - Hep panel neg. No other suspicion for liver disease  \par \par # Dyslipidemia  \par - C/w Lipitor 40mg qD. 3/2022 LDL 75  \par \par # HCM  \par -COVID booster reminded\par -CRC due 2028. C-scope July 2021 with tubular adenoma  \par -Mammo Feb 2022 BIRADS2; referral provided \par -PAP/HPV due 2025. Pt requested GYN referral as appt is made already \par -Flu UTD\par \par =====================================\par \par RTC in 3 months for DM and hepatic steatosis\par \par Care discussed with Dr. Bauer\par \par Marilou Hernandez, PGY3\par [facility]\par

## 2023-02-15 NOTE — HEALTH RISK ASSESSMENT
[Very Good] : ~his/her~  mood as very good [No] : No [0] : 2) Feeling down, depressed, or hopeless: Not at all (0) [PHQ-2 Negative - No further assessment needed] : PHQ-2 Negative - No further assessment needed [[id number]] : 0

## 2023-02-16 ENCOUNTER — APPOINTMENT (OUTPATIENT)
Dept: HEPATOLOGY | Facility: CLINIC | Age: 48
End: 2023-02-16

## 2023-02-16 DIAGNOSIS — K76.0 FATTY (CHANGE OF) LIVER, NOT ELSEWHERE CLASSIFIED: ICD-10-CM

## 2023-02-16 DIAGNOSIS — Z00.00 ENCOUNTER FOR GENERAL ADULT MEDICAL EXAMINATION WITHOUT ABNORMAL FINDINGS: ICD-10-CM

## 2023-02-16 DIAGNOSIS — E11.9 TYPE 2 DIABETES MELLITUS WITHOUT COMPLICATIONS: ICD-10-CM

## 2023-03-06 ENCOUNTER — APPOINTMENT (OUTPATIENT)
Dept: HEPATOLOGY | Facility: CLINIC | Age: 48
End: 2023-03-06
Payer: SELF-PAY

## 2023-03-06 PROCEDURE — 76981 USE PARENCHYMA: CPT

## 2023-03-10 ENCOUNTER — NON-APPOINTMENT (OUTPATIENT)
Age: 48
End: 2023-03-10

## 2023-03-17 ENCOUNTER — APPOINTMENT (OUTPATIENT)
Dept: OBGYN | Facility: CLINIC | Age: 48
End: 2023-03-17

## 2023-03-17 ENCOUNTER — NON-APPOINTMENT (OUTPATIENT)
Age: 48
End: 2023-03-17

## 2023-03-21 ENCOUNTER — APPOINTMENT (OUTPATIENT)
Dept: OBGYN | Facility: CLINIC | Age: 48
End: 2023-03-21

## 2023-04-19 ENCOUNTER — APPOINTMENT (OUTPATIENT)
Dept: INTERNAL MEDICINE | Facility: CLINIC | Age: 48
End: 2023-04-19
Payer: COMMERCIAL

## 2023-04-19 ENCOUNTER — OUTPATIENT (OUTPATIENT)
Dept: OUTPATIENT SERVICES | Facility: HOSPITAL | Age: 48
LOS: 1 days | End: 2023-04-19
Payer: SELF-PAY

## 2023-04-19 ENCOUNTER — RESULT REVIEW (OUTPATIENT)
Age: 48
End: 2023-04-19

## 2023-04-19 VITALS
DIASTOLIC BLOOD PRESSURE: 78 MMHG | BODY MASS INDEX: 35.34 KG/M2 | WEIGHT: 180 LBS | HEART RATE: 80 BPM | SYSTOLIC BLOOD PRESSURE: 122 MMHG | HEIGHT: 60 IN | OXYGEN SATURATION: 98 %

## 2023-04-19 DIAGNOSIS — R74.01 ELEVATION OF LEVELS OF LIVER TRANSAMINASE LEVELS: ICD-10-CM

## 2023-04-19 DIAGNOSIS — M54.50 LOW BACK PAIN, UNSPECIFIED: ICD-10-CM

## 2023-04-19 DIAGNOSIS — D50.0 IRON DEFICIENCY ANEMIA SECONDARY TO BLOOD LOSS (CHRONIC): ICD-10-CM

## 2023-04-19 DIAGNOSIS — L72.9 FOLLICULAR CYST OF THE SKIN AND SUBCUTANEOUS TISSUE, UNSPECIFIED: ICD-10-CM

## 2023-04-19 DIAGNOSIS — Z87.19 PERSONAL HISTORY OF OTHER DISEASES OF THE DIGESTIVE SYSTEM: ICD-10-CM

## 2023-04-19 DIAGNOSIS — Z86.2 PERSONAL HISTORY OF DISEASES OF THE BLOOD AND BLOOD-FORMING ORGANS AND CERTAIN DISORDERS INVOLVING THE IMMUNE MECHANISM: ICD-10-CM

## 2023-04-19 DIAGNOSIS — R10.819 ABDOMINAL TENDERNESS, UNSPECIFIED SITE: ICD-10-CM

## 2023-04-19 DIAGNOSIS — R73.03 PREDIABETES.: ICD-10-CM

## 2023-04-19 DIAGNOSIS — R10.32 LEFT LOWER QUADRANT PAIN: ICD-10-CM

## 2023-04-19 DIAGNOSIS — Z87.898 PERSONAL HISTORY OF OTHER SPECIFIED CONDITIONS: ICD-10-CM

## 2023-04-19 DIAGNOSIS — Z11.3 ENCOUNTER FOR SCREENING FOR INFECTIONS WITH A PREDOMINANTLY SEXUAL MODE OF TRANSMISSION: ICD-10-CM

## 2023-04-19 DIAGNOSIS — Z98.51 TUBAL LIGATION STATUS: Chronic | ICD-10-CM

## 2023-04-19 DIAGNOSIS — Z86.010 PERSONAL HISTORY OF COLONIC POLYPS: ICD-10-CM

## 2023-04-19 DIAGNOSIS — Z90.49 ACQUIRED ABSENCE OF OTHER SPECIFIED PARTS OF DIGESTIVE TRACT: Chronic | ICD-10-CM

## 2023-04-19 DIAGNOSIS — Z86.03 PERSONAL HISTORY OF NEOPLASM OF UNCERTAIN BEHAVIOR: ICD-10-CM

## 2023-04-19 PROCEDURE — G0463: CPT

## 2023-04-19 PROCEDURE — ZZZZZ: CPT

## 2023-04-19 RX ORDER — POLYETHYLENE GLYCOL 3350 AND ELECTROLYTES WITH LEMON FLAVOR 236; 22.74; 6.74; 5.86; 2.97 G/4L; G/4L; G/4L; G/4L; G/4L
236 POWDER, FOR SOLUTION ORAL
Qty: 1 | Refills: 0 | Status: COMPLETED | COMMUNITY
Start: 2021-06-15 | End: 2023-04-19

## 2023-04-19 RX ORDER — LIDOCAINE 40 MG/G
4 PATCH TOPICAL
Qty: 5 | Refills: 2 | Status: COMPLETED | COMMUNITY
Start: 2021-09-01 | End: 2023-04-19

## 2023-04-19 RX ORDER — METFORMIN HYDROCHLORIDE 1000 MG/1
1000 TABLET, COATED ORAL
Qty: 180 | Refills: 1 | Status: COMPLETED | COMMUNITY
Start: 2022-10-20 | End: 2023-04-19

## 2023-04-19 RX ORDER — DICLOFENAC SODIUM 1% 10 MG/G
1 GEL TOPICAL
Qty: 1 | Refills: 0 | Status: COMPLETED | COMMUNITY
Start: 2021-12-16 | End: 2023-04-19

## 2023-04-19 RX ORDER — METFORMIN ER 500 MG 500 MG/1
500 TABLET ORAL
Qty: 120 | Refills: 8 | Status: COMPLETED | COMMUNITY
Start: 2022-07-01 | End: 2023-04-19

## 2023-04-20 NOTE — ASSESSMENT
[FreeTextEntry1] : 47F with PMH of T2DM, obesity, hepatic steatosis, chronic let hip pain, presents for left lower back pain with CVA tenderness on exam concerning for pain associated with left renal cyst. \par

## 2023-04-20 NOTE — PHYSICAL EXAM
[No Edema] : there was no peripheral edema [Normal] : soft, non-tender, non-distended, no masses palpated, no HSM and normal bowel sounds [de-identified] : Left CVA tenderness

## 2023-04-20 NOTE — PLAN
[FreeTextEntry1] : # Left CVA tenderness\par - Chart review with known 5.2cm large left renal cyst, unclear if this is reason for left CVA tenderness but no signs of pyelonephritis or obstructive uropathy at this time\par - Will pursue renal ultrasound \par - Check UA for microscopic hematuria\par - May consider repeat abd MRI to monitor/characterize renal cyst should ultrasound reveal any changes \par \par # T2DM well controlled  \par - A1C 5.9 03/2023 \par - C/w Trulicity 1.5 q/Wk & metformin 1g BID  \par - ophtho referral given  \par - Urine screen neg Aug 22  \par - foot exam wnl 03/2023 \par \par # Obesity class 1  \par - Following weight management \par - Trulicity and metformin as above \par - Pt to start walking on treadmill 3x a week  \par \par # Hepatic steatosis  \par - Found on abd u/s, which was obtained due to transaminitis. Repeat CMP next visit\par - FIB 4 score 2.3 ; intermediate risk \par - Referral to hepatology provided \par - Fibroscan NO FIBROSIS: F0-F1 - None to Mild; STEATOSIS: S3 - Stage 3 steatosis - 67% or more \par - Hep panel neg. No other suspicion for liver disease  \par   \par # Dyslipidemia  \par - C/w Lipitor 40mg qD. 3/2022 LDL 75, at goal\par   \par # HCM  \par -COVID booster reminded \par -CRC due 2028. C-scope July 2021 with tubular adenoma  \par -Mammo Feb 2022 BIRADS2; referral provided  \par -PAP/HPV due 2025. Pt requested GYN referral as appt is made already  \par -Vax UTD \par  \par ========================\par RTC in 3 months for DM and HLD management. \par \par Care discussed with Dr. Bauer\par \par Marilou Hernandez, PGY3\par [facility]\par

## 2023-04-20 NOTE — HISTORY OF PRESENT ILLNESS
[FreeTextEntry1] : Follow up [de-identified] : 47F with PMH of T2DM, obesity, hepatic steatosis, chronic let hip pain, presents for f/u of chronic medical conditions. She is accompanied by her  today, who she prefers to be her . She reports a 1 week history of left sided lower back pain. It is 6 out of 10 in severity, pressure like, non radiating, and not associated with any urinary symptoms including dysuria, hematuria, flank pain, etc. She has had similar pain in the past which spontaneously resolves. She has not tried taking any analgesics. She states the pain occurs daily but has not worsened since onset. She denies injury or trauma to the area.

## 2023-04-25 ENCOUNTER — OUTPATIENT (OUTPATIENT)
Dept: OUTPATIENT SERVICES | Facility: HOSPITAL | Age: 48
LOS: 1 days | End: 2023-04-25
Payer: COMMERCIAL

## 2023-04-25 ENCOUNTER — NON-APPOINTMENT (OUTPATIENT)
Age: 48
End: 2023-04-25

## 2023-04-25 ENCOUNTER — APPOINTMENT (OUTPATIENT)
Dept: MAMMOGRAPHY | Facility: IMAGING CENTER | Age: 48
End: 2023-04-25
Payer: COMMERCIAL

## 2023-04-25 ENCOUNTER — RESULT REVIEW (OUTPATIENT)
Age: 48
End: 2023-04-25

## 2023-04-25 ENCOUNTER — APPOINTMENT (OUTPATIENT)
Dept: OPHTHALMOLOGY | Facility: CLINIC | Age: 48
End: 2023-04-25
Payer: COMMERCIAL

## 2023-04-25 DIAGNOSIS — Z98.51 TUBAL LIGATION STATUS: Chronic | ICD-10-CM

## 2023-04-25 DIAGNOSIS — Z90.49 ACQUIRED ABSENCE OF OTHER SPECIFIED PARTS OF DIGESTIVE TRACT: Chronic | ICD-10-CM

## 2023-04-25 DIAGNOSIS — Z00.8 ENCOUNTER FOR OTHER GENERAL EXAMINATION: ICD-10-CM

## 2023-04-25 PROCEDURE — 92004 COMPRE OPH EXAM NEW PT 1/>: CPT

## 2023-04-25 PROCEDURE — 77067 SCR MAMMO BI INCL CAD: CPT

## 2023-04-25 PROCEDURE — 77063 BREAST TOMOSYNTHESIS BI: CPT | Mod: 26

## 2023-04-25 PROCEDURE — 77063 BREAST TOMOSYNTHESIS BI: CPT

## 2023-04-25 PROCEDURE — 92015 DETERMINE REFRACTIVE STATE: CPT | Mod: NC

## 2023-04-25 PROCEDURE — 77067 SCR MAMMO BI INCL CAD: CPT | Mod: 26

## 2023-04-26 ENCOUNTER — NON-APPOINTMENT (OUTPATIENT)
Age: 48
End: 2023-04-26

## 2023-05-01 DIAGNOSIS — I10 ESSENTIAL (PRIMARY) HYPERTENSION: ICD-10-CM

## 2023-05-02 ENCOUNTER — APPOINTMENT (OUTPATIENT)
Dept: ULTRASOUND IMAGING | Facility: CLINIC | Age: 48
End: 2023-05-02
Payer: COMMERCIAL

## 2023-05-02 ENCOUNTER — OUTPATIENT (OUTPATIENT)
Dept: OUTPATIENT SERVICES | Facility: HOSPITAL | Age: 48
LOS: 1 days | End: 2023-05-02
Payer: SELF-PAY

## 2023-05-02 DIAGNOSIS — Z98.51 TUBAL LIGATION STATUS: Chronic | ICD-10-CM

## 2023-05-02 DIAGNOSIS — I10 ESSENTIAL (PRIMARY) HYPERTENSION: ICD-10-CM

## 2023-05-02 DIAGNOSIS — Z90.49 ACQUIRED ABSENCE OF OTHER SPECIFIED PARTS OF DIGESTIVE TRACT: Chronic | ICD-10-CM

## 2023-05-02 PROCEDURE — 76770 US EXAM ABDO BACK WALL COMP: CPT | Mod: 26

## 2023-05-02 PROCEDURE — 76770 US EXAM ABDO BACK WALL COMP: CPT

## 2023-05-03 DIAGNOSIS — E11.9 TYPE 2 DIABETES MELLITUS WITHOUT COMPLICATIONS: ICD-10-CM

## 2023-05-03 DIAGNOSIS — K76.0 FATTY (CHANGE OF) LIVER, NOT ELSEWHERE CLASSIFIED: ICD-10-CM

## 2023-05-03 DIAGNOSIS — N28.1 CYST OF KIDNEY, ACQUIRED: ICD-10-CM

## 2023-05-04 ENCOUNTER — APPOINTMENT (OUTPATIENT)
Dept: HEPATOLOGY | Facility: CLINIC | Age: 48
End: 2023-05-04

## 2023-06-23 ENCOUNTER — APPOINTMENT (OUTPATIENT)
Dept: UROLOGY | Facility: CLINIC | Age: 48
End: 2023-06-23

## 2023-06-23 ENCOUNTER — OUTPATIENT (OUTPATIENT)
Dept: OUTPATIENT SERVICES | Facility: HOSPITAL | Age: 48
LOS: 1 days | End: 2023-06-23
Payer: SELF-PAY

## 2023-06-23 DIAGNOSIS — K76.0 FATTY (CHANGE OF) LIVER, NOT ELSEWHERE CLASSIFIED: ICD-10-CM

## 2023-06-23 DIAGNOSIS — R35.0 FREQUENCY OF MICTURITION: ICD-10-CM

## 2023-06-23 DIAGNOSIS — N28.1 CYST OF KIDNEY, ACQUIRED: ICD-10-CM

## 2023-06-23 DIAGNOSIS — Z90.49 ACQUIRED ABSENCE OF OTHER SPECIFIED PARTS OF DIGESTIVE TRACT: Chronic | ICD-10-CM

## 2023-06-23 DIAGNOSIS — Z98.51 TUBAL LIGATION STATUS: Chronic | ICD-10-CM

## 2023-06-23 DIAGNOSIS — E11.9 TYPE 2 DIABETES MELLITUS WITHOUT COMPLICATIONS: ICD-10-CM

## 2023-06-23 PROCEDURE — G0463: CPT

## 2023-06-23 NOTE — ASSESSMENT
[FreeTextEntry1] : 49 yo female with 5 cm Bosniak IIF renal cyst, stable since 2017 \par \par -- discussed natural history of renal cysts and discussed 5% risk of malignancy associated with renal cyst \par -- discussed options including continued surveillance versus partial nephrectomy \par -- patient and  feel comfortable continuing to survey \par -- will follow up in November ([place] from [place]) for MRI Abdomen w/ w/o IVC (Rx given)\par -- RTC after MRI performed

## 2023-06-26 ENCOUNTER — APPOINTMENT (OUTPATIENT)
Dept: INTERNAL MEDICINE | Facility: CLINIC | Age: 48
End: 2023-06-26

## 2023-06-27 DIAGNOSIS — N28.1 CYST OF KIDNEY, ACQUIRED: ICD-10-CM

## 2023-07-06 RX ORDER — ATORVASTATIN CALCIUM 40 MG/1
40 TABLET, FILM COATED ORAL
Qty: 90 | Refills: 3 | Status: ACTIVE | COMMUNITY
Start: 2019-04-09 | End: 1900-01-01

## 2023-07-07 RX ORDER — METFORMIN HYDROCHLORIDE 1000 MG/1
1000 TABLET, FILM COATED, EXTENDED RELEASE ORAL
Qty: 180 | Refills: 1 | Status: DISCONTINUED | COMMUNITY
Start: 2022-05-04 | End: 2023-07-07

## 2023-07-13 ENCOUNTER — EMERGENCY (EMERGENCY)
Facility: HOSPITAL | Age: 48
LOS: 1 days | Discharge: HOME | End: 2023-07-13
Admitting: EMERGENCY MEDICINE
Payer: MEDICAID

## 2023-07-13 VITALS
RESPIRATION RATE: 18 BRPM | DIASTOLIC BLOOD PRESSURE: 84 MMHG | TEMPERATURE: 98 F | HEART RATE: 74 BPM | OXYGEN SATURATION: 100 % | SYSTOLIC BLOOD PRESSURE: 166 MMHG

## 2023-07-13 DIAGNOSIS — Z90.49 ACQUIRED ABSENCE OF OTHER SPECIFIED PARTS OF DIGESTIVE TRACT: Chronic | ICD-10-CM

## 2023-07-13 DIAGNOSIS — Z98.51 TUBAL LIGATION STATUS: Chronic | ICD-10-CM

## 2023-07-13 PROCEDURE — 99284 EMERGENCY DEPT VISIT MOD MDM: CPT

## 2023-07-14 ENCOUNTER — NON-APPOINTMENT (OUTPATIENT)
Age: 48
End: 2023-07-14

## 2023-07-14 RX ORDER — OXYCODONE AND ACETAMINOPHEN 5; 325 MG/1; MG/1
1 TABLET ORAL
Qty: 12 | Refills: 0
Start: 2023-07-14

## 2023-07-14 RX ORDER — KETOROLAC TROMETHAMINE 30 MG/ML
30 SYRINGE (ML) INJECTION ONCE
Refills: 0 | Status: DISCONTINUED | OUTPATIENT
Start: 2023-07-14 | End: 2023-07-14

## 2023-07-14 RX ADMIN — Medication 30 MILLIGRAM(S): at 01:55

## 2023-07-14 RX ADMIN — Medication 1 TABLET(S): at 02:02

## 2023-07-14 NOTE — ED PROVIDER NOTE - NSICDXPASTSURGICALHX_GEN_ALL_CORE_FT
PAST SURGICAL HISTORY:  H/O tubal ligation     History of cholecystectomy     No Past Surgical History

## 2023-07-14 NOTE — ED PROVIDER NOTE - OBJECTIVE STATEMENT
Patient is a 48-year-old female with past medical history of diabetes diagnosed with otitis externa presenting with bilateral ear pain times tonight.  Patient states that she was evaluated in urgent care earlier today, prescribed polymyxin B eyedrops and ibuprofen.  Patient admits to taking ibuprofen however pain has continued to persist.  No associated fever, chills, sore throat, tinnitus, otorrhea.  She denies recent travel, swimming, foreign body to ears prior to the onset of symptoms

## 2023-07-14 NOTE — ED PROVIDER NOTE - PATIENT PORTAL LINK FT
You can access the FollowMyHealth Patient Portal offered by [facility] by registering at the following website: [email or web address]. By joining [employer]’s FollowMyHealth portal, you will also be able to view your health information using other applications (apps) compatible with our system.

## 2023-07-14 NOTE — ED PROVIDER NOTE - NSFOLLOWUPINSTRUCTIONS_ED_ALL_ED_FT
Otitis externa  Otitis Externa  Ear anatomy showing the outer ear canal and the eardrum. The outer ear canal is red due to swimmer's ear.  La otitis externa es kezia infección del canal auditivo externo. El canal auditivo externo es la cat que está entre el exterior de la oreja y el tímpano. A la otitis externa también se la llama oído de nadador.    ¿Cuáles son las causas?  Las causas más frecuentes de esta afección incluyen las siguientes:  Nadar en agua sucia.  Humedad en el oído.  Kezia lesión en el interior del oído.  Un objeto atascado en el oído.  Un german o rasguño en la parte exterior del oído o en el canal auditivo.  ¿Qué incrementa el riesgo?  Es más probable que presente esta afección si nada con frecuencia.    ¿Cuáles son los signos o síntomas?  En general, el primer síntoma de esta afección es la picazón en el canal auditivo. Los síntomas posteriores de la afección incluyen los siguientes:  Hinchazón del oído.  Enrojecimiento del oído.  Dolor de oído. El dolor puede empeorar cuando se cami de la oreja.  Secreción de pus del oído.  ¿Cómo se diagnostica?  Esta afección puede diagnosticarse con un examen del oído y un análisis del líquido que sale del oído para detectar si tiene bacterias y hongos.    ¿Cómo se trata?  El tratamiento de esta afección puede incluir:  Gotas óticas con antibiótico. Generalmente se aplican jarrett 10 a 14 días.  Medicamentos para reducir la picazón y la hinchazón.  Siga estas instrucciones en shell casa:  Si le recetaron gotas óticas con antibiótico, úselas alicia se lo haya indicado el médico. No deje de usar el antibiótico aunque comience a sentirse mejor.  Use los medicamentos de venta yariel y los recetados solamente alicia se lo haya indicado el médico.  Evite que le entre agua en los oídos, alicia se lo haya indicado el médico. [place] puede incluir no nadar ni practicar deportes de agua jarrett algunos días.  Concurra a todas las visitas de seguimiento. [place] es importante.  ¿Cómo se jane?  Mantenga secos los oídos. Use la punta de kezia toalla para secarse los oídos después de nadar o de darse un baño.  Evite rascarse o poner objetos en el oído. Estas acciones pueden dañar el canal auditivo o remover el recubrimiento de cera que lo protege y así facilitar el crecimiento de bacterias y hongos.  Evite nadar en manuel, en agua contaminada o en piscinas que pueden no tener el cloro suficiente.  Comuníquese con un médico si:  Tiene fiebre.  Shell oído continúa allen, hinchado, le duele o supura pus después de 3 días.  El dolor, la hinchazón o el enrojecimiento empeoran.  Siente un dolor de denise intenso.  Solicite ayuda de inmediato si:  Tiene en la cat detrás de la oreja callie, hinchada, le duele o está sensible.  Resumen  La otitis externa es kezia infección del canal auditivo externo.  Las causas frecuentes son nadar en agua sucia, que quede humedad en el oído o tener un german o un rasguño en el oído.  Los síntomas son dolor, enrojecimiento e hinchazón del canal auditivo.  Si le recetaron gotas óticas con antibiótico, úselas alicia se lo haya indicado el médico. No deje de usar el antibiótico aunque comience a sentirse mejor.  Esta información no tiene alicia fin reemplazar el consejo del médico. Asegúrese de hacerle al médico cualquier pregunta que tenga.

## 2023-07-14 NOTE — ED PROVIDER NOTE - ENMT, MLM
ears:  bilateral swelling of the external acoustic canal unable to visualize tympanic membrane of both ears.  Pain with manipulation of auricle.  No postauricular/mastoid tenderness.  Throat unremarkableAirway patent, Nasal mucosa clear. Mouth with normal mucosa. Throat has no vesicles, no oropharyngeal exudates and uvula is midline.

## 2023-07-14 NOTE — ED PROVIDER NOTE - CLINICAL SUMMARY MEDICAL DECISION MAKING FREE TEXT BOX
48-year-old female recently diagnosed with otitis externa presenting to the ED with complaint of severe bilateral ear pain.  On presentation patient appears uncomfortable, vital stable on exam prominent swelling to the acoustic canals unable to visualize tympanic membranes.  Patient will be treated with Augmentin in addition to eyedrops that she is currently taking we will also give Toradol, Percocet.  Prescriptions will be sent to pharmacy.  Patient will be provided with a ENT referral to follow-up

## 2023-07-25 ENCOUNTER — APPOINTMENT (OUTPATIENT)
Dept: OPHTHALMOLOGY | Facility: CLINIC | Age: 48
End: 2023-07-25
Payer: COMMERCIAL

## 2023-07-25 PROCEDURE — 92083 EXTENDED VISUAL FIELD XM: CPT

## 2023-07-25 PROCEDURE — 92133 CPTRZD OPH DX IMG PST SGM ON: CPT

## 2023-07-25 PROCEDURE — 92012 INTRM OPH EXAM EST PATIENT: CPT

## 2023-07-25 PROCEDURE — 76514 ECHO EXAM OF EYE THICKNESS: CPT

## 2023-08-06 NOTE — PACU DISCHARGE NOTE - AIRWAY PATENCY:
Satisfactory Testing:    Recent Results (from the past 24 hour(s))   Basic Metabolic Panel w/ Reflex to MG    Collection Time: 08/06/23  5:06 AM   Result Value Ref Range    Glucose 110 (H) 70 - 99 mg/dL    BUN 31 (H) 8 - 23 mg/dL    Creatinine 1.7 (H) 0.7 - 1.2 mg/dL    Est, Glom Filt Rate 43 (L) >60 mL/min/1.73m2    Calcium 7.5 (L) 8.6 - 10.4 mg/dL    Sodium 136 135 - 144 mmol/L    Potassium 4.4 3.7 - 5.3 mmol/L    Chloride 105 98 - 107 mmol/L    CO2 20 20 - 31 mmol/L    Anion Gap 11 9 - 17 mmol/L   CBC with Auto Differential    Collection Time: 08/06/23  5:06 AM   Result Value Ref Range    WBC 6.8 3.5 - 11.3 k/uL    RBC 2.57 (L) 4.21 - 5.77 m/uL    Hemoglobin 7.8 (L) 13.0 - 17.0 g/dL    Hematocrit 24.7 (L) 40.7 - 50.3 %    MCV 96.1 82.6 - 102.9 fL    MCH 30.4 25.2 - 33.5 pg    MCHC 31.6 28.4 - 34.8 g/dL    RDW 16.2 (H) 11.8 - 14.4 %    Platelets See Reflexed IPF Result 138 - 453 k/uL    Platelet, Fluorescence 128 (L) 138 - 453 k/uL    Platelet, Immature Fraction 3.8 1.1 - 10.3 %    NRBC Automated 0.0 0.0 per 100 WBC    Immature Granulocytes 0 0 %    Neutrophils % 91 (H) 36 - 66 %    Lymphocytes % 6 (L) 24 - 44 %    Monocytes % 3 1 - 7 %    Eosinophils % 0 (L) 1 - 4 %    Basophils % 0 0 - 2 %    Absolute Immature Granulocyte 0.00 0.00 - 0.30 k/uL    Neutrophils Absolute 6.19 1.8 - 7.7 k/uL    Lymphocytes Absolute 0.41 (L) 1.0 - 4.8 k/uL    Monocytes Absolute 0.20 0.1 - 0.8 k/uL    Eosinophils Absolute 0.00 0.0 - 0.4 k/uL    Basophils Absolute 0.00 0.0 - 0.2 k/uL    Morphology ANISOCYTOSIS PRESENT          Imaging/Diagonstics:  EKG: Foye Tio     CXR:     Current Facility-Administered Medications   Medication Dose Route Frequency Provider Last Rate Last Admin    loperamide (IMODIUM) capsule 2 mg  2 mg Oral 4x Daily PRN Rich Valle MD   2 mg at 08/05/23 2201    sodium chloride flush 0.9 % injection 5-40 mL  5-40 mL IntraVENous 2 times per day Armida Chaudhary MD   10 mL at 08/05/23 2201    sodium chloride flush 0.9 % injection 5-40 mL effusion    Metastatic colon cancer to liver ([address])    Malignant ascites  Resolved Problems:    * No resolved hospital problems. *      PLAN:     Discussed care plan with nurse after getting her input.     Colorectal cancer with metastasis  -Surgical pathology was collected from the ascending colon mass during last colonoscopy on 07/26/2023 showed well to moderately differentiated adenocarcinoma invading into the lamina propria   - CT abdomen showed an enlarged liver with numerous enhancing lesions consistent with metastasis, also showed moderate to moderately large volume ascitic fluid in the abdomen and pelvis, CT of the chest was negative for pulmonary embolus, but showed pleural effusions and a 7 mm cavitary nodule unchanged from previous CT  -Oncology on board   -Continue with supportive care and paracentecis and electrolyte replacement   -Will arrange systemic therapy when he becomes more stable , currently too weak to initiate therapy   -Palliative care consulted , appreciate recs       Anemia likely secondary to colorectal cancer  Hemoglobin was 6.5, received 1 unit of PRBCs, repeat hemoglobin 8.1  -Hgb today is 7.8 (8/6)  -Will continue to monitor       History of COPD  -Uses 4 L of oxygen at home  -Currently is at 4 L saturating well  -Symbicort inhaler and Duoneb    YUEN secondary to prerenal azotemia  -Patient has been having diarrhea since last admission  -Patient was started on 50 cc an hour fluids overnight, creatinine unchanged from yesterday, still 1.6  -Fluids held due to moderate to large ascites  -Crt today is 1.7 and BUN 31 (unchanged)  -will continue to monitor     Hypertension  Lisinopril held due to UYEN  Coreg resumed  -Currently also holding home Norvasc 10mg as well Hydralazine 50 mg     DVT prophylaxis, held due to low hemoglobin  GI prophylaxis, Protonix 40 mg twice daily    Marisol Gallegos DO  Family Medicine Resident  Family medicine /Internal medicine service   8/6/2023 6:59 AM

## 2023-10-05 RX ORDER — DULAGLUTIDE 1.5 MG/.5ML
1.5 INJECTION, SOLUTION SUBCUTANEOUS
Qty: 4 | Refills: 5 | Status: ACTIVE | COMMUNITY
Start: 2022-03-31 | End: 1900-01-01

## 2023-10-08 ENCOUNTER — RESULT CHARGE (OUTPATIENT)
Age: 48
End: 2023-10-08

## 2023-10-09 ENCOUNTER — NON-APPOINTMENT (OUTPATIENT)
Age: 48
End: 2023-10-09

## 2023-10-09 ENCOUNTER — APPOINTMENT (OUTPATIENT)
Age: 48
End: 2023-10-09
Payer: COMMERCIAL

## 2023-10-09 ENCOUNTER — MED ADMIN CHARGE (OUTPATIENT)
Age: 48
End: 2023-10-09

## 2023-10-09 VITALS
BODY MASS INDEX: 35.34 KG/M2 | DIASTOLIC BLOOD PRESSURE: 80 MMHG | WEIGHT: 180 LBS | HEART RATE: 77 BPM | SYSTOLIC BLOOD PRESSURE: 120 MMHG | HEIGHT: 60 IN | OXYGEN SATURATION: 98 %

## 2023-10-09 DIAGNOSIS — Z01.818 ENCOUNTER FOR OTHER PREPROCEDURAL EXAMINATION: ICD-10-CM

## 2023-10-09 DIAGNOSIS — Z00.00 ENCOUNTER FOR GENERAL ADULT MEDICAL EXAMINATION W/OUT ABNORMAL FINDINGS: ICD-10-CM

## 2023-10-09 PROCEDURE — 99214 OFFICE O/P EST MOD 30 MIN: CPT | Mod: GE

## 2023-10-11 LAB
ALBUMIN SERPL ELPH-MCNC: 4.8 G/DL
ALP BLD-CCNC: 157 U/L
ALT SERPL-CCNC: 65 U/L
ANION GAP SERPL CALC-SCNC: 11 MMOL/L
APTT BLD: 39.6 SEC
AST SERPL-CCNC: 81 U/L
BASOPHILS # BLD AUTO: 0.02 K/UL
BASOPHILS NFR BLD AUTO: 0.3 %
BILIRUB SERPL-MCNC: 0.5 MG/DL
BUN SERPL-MCNC: 14 MG/DL
CALCIUM SERPL-MCNC: 9.5 MG/DL
CHLORIDE SERPL-SCNC: 104 MMOL/L
CHOLEST SERPL-MCNC: 148 MG/DL
CO2 SERPL-SCNC: 26 MMOL/L
CREAT SERPL-MCNC: 0.68 MG/DL
CREAT SPEC-SCNC: 157 MG/DL
EGFR: 106 ML/MIN/1.73M2
EOSINOPHIL # BLD AUTO: 0.29 K/UL
EOSINOPHIL NFR BLD AUTO: 4.2 %
ESTIMATED AVERAGE GLUCOSE: 186 MG/DL
GLUCOSE SERPL-MCNC: 123 MG/DL
HBA1C MFR BLD HPLC: 8.1 %
HCT VFR BLD CALC: 44.8 %
HDLC SERPL-MCNC: 64 MG/DL
HGB BLD-MCNC: 14.8 G/DL
IMM GRANULOCYTES NFR BLD AUTO: 0.1 %
INR PPP: 1.13 RATIO
LDLC SERPL CALC-MCNC: 60 MG/DL
LYMPHOCYTES # BLD AUTO: 3.8 K/UL
LYMPHOCYTES NFR BLD AUTO: 54.5 %
MAN DIFF?: NORMAL
MCHC RBC-ENTMCNC: 29.5 PG
MCHC RBC-ENTMCNC: 33 GM/DL
MCV RBC AUTO: 89.4 FL
MICROALBUMIN 24H UR DL<=1MG/L-MCNC: 2.3 MG/DL
MICROALBUMIN/CREAT 24H UR-RTO: 14 MG/G
MONOCYTES # BLD AUTO: 0.45 K/UL
MONOCYTES NFR BLD AUTO: 6.5 %
NEUTROPHILS # BLD AUTO: 2.4 K/UL
NEUTROPHILS NFR BLD AUTO: 34.4 %
NONHDLC SERPL-MCNC: 84 MG/DL
PLATELET # BLD AUTO: 165 K/UL
POTASSIUM SERPL-SCNC: 4.6 MMOL/L
PROT SERPL-MCNC: 7.6 G/DL
PT BLD: 12.7 SEC
RBC # BLD: 5.01 M/UL
RBC # FLD: 14.2 %
SODIUM SERPL-SCNC: 141 MMOL/L
TRIGL SERPL-MCNC: 141 MG/DL
WBC # FLD AUTO: 6.97 K/UL

## 2023-10-13 ENCOUNTER — TRANSCRIPTION ENCOUNTER (OUTPATIENT)
Age: 48
End: 2023-10-13

## 2023-10-13 PROBLEM — Z01.818 PREOPERATIVE EXAMINATION: Status: ACTIVE | Noted: 2023-10-13

## 2023-10-18 ENCOUNTER — APPOINTMENT (OUTPATIENT)
Dept: HEPATOLOGY | Facility: CLINIC | Age: 48
End: 2023-10-18
Payer: SELF-PAY

## 2023-10-18 PROCEDURE — 76981 USE PARENCHYMA: CPT

## 2023-10-20 ENCOUNTER — APPOINTMENT (OUTPATIENT)
Dept: INTERNAL MEDICINE | Facility: CLINIC | Age: 48
End: 2023-10-20

## 2023-10-26 ENCOUNTER — APPOINTMENT (OUTPATIENT)
Dept: OPHTHALMOLOGY | Facility: CLINIC | Age: 48
End: 2023-10-26

## 2023-11-24 ENCOUNTER — APPOINTMENT (OUTPATIENT)
Dept: UROLOGY | Facility: CLINIC | Age: 48
End: 2023-11-24

## 2024-01-25 RX ORDER — DULAGLUTIDE 1.5 MG/.5ML
1.5 INJECTION, SOLUTION SUBCUTANEOUS
Qty: 8 | Refills: 6 | Status: ACTIVE | COMMUNITY
Start: 2023-10-09 | End: 1900-01-01

## 2024-01-25 RX ORDER — METFORMIN HYDROCHLORIDE 1000 MG/1
1000 TABLET, COATED ORAL TWICE DAILY
Qty: 180 | Refills: 3 | Status: ACTIVE | COMMUNITY
Start: 2019-04-09 | End: 1900-01-01

## 2024-02-26 ENCOUNTER — OUTPATIENT (OUTPATIENT)
Dept: OUTPATIENT SERVICES | Facility: HOSPITAL | Age: 49
LOS: 1 days | End: 2024-02-26
Payer: SELF-PAY

## 2024-02-26 ENCOUNTER — APPOINTMENT (OUTPATIENT)
Dept: INTERNAL MEDICINE | Facility: CLINIC | Age: 49
End: 2024-02-26
Payer: COMMERCIAL

## 2024-02-26 VITALS
BODY MASS INDEX: 33.77 KG/M2 | HEART RATE: 84 BPM | OXYGEN SATURATION: 97 % | SYSTOLIC BLOOD PRESSURE: 120 MMHG | DIASTOLIC BLOOD PRESSURE: 82 MMHG | HEIGHT: 60 IN | WEIGHT: 172 LBS

## 2024-02-26 DIAGNOSIS — I10 ESSENTIAL (PRIMARY) HYPERTENSION: ICD-10-CM

## 2024-02-26 DIAGNOSIS — K76.0 FATTY (CHANGE OF) LIVER, NOT ELSEWHERE CLASSIFIED: ICD-10-CM

## 2024-02-26 DIAGNOSIS — E11.9 TYPE 2 DIABETES MELLITUS W/OUT COMPLICATIONS: ICD-10-CM

## 2024-02-26 DIAGNOSIS — Z90.49 ACQUIRED ABSENCE OF OTHER SPECIFIED PARTS OF DIGESTIVE TRACT: Chronic | ICD-10-CM

## 2024-02-26 DIAGNOSIS — F52.9 UNSPECIFIED SEXUAL DYSFUNCTION NOT DUE TO A SUBSTANCE OR KNOWN PHYSIOLOGICAL CONDITION: ICD-10-CM

## 2024-02-26 DIAGNOSIS — Z98.51 TUBAL LIGATION STATUS: Chronic | ICD-10-CM

## 2024-02-26 LAB — HBA1C MFR BLD HPLC: 8.3

## 2024-02-26 PROCEDURE — G0463: CPT

## 2024-02-26 PROCEDURE — 99213 OFFICE O/P EST LOW 20 MIN: CPT | Mod: GE

## 2024-02-26 PROCEDURE — 83036 HEMOGLOBIN GLYCOSYLATED A1C: CPT

## 2024-02-26 RX ORDER — DULAGLUTIDE 3 MG/.5ML
3 INJECTION, SOLUTION SUBCUTANEOUS
Qty: 4 | Refills: 3 | Status: ACTIVE | COMMUNITY
Start: 2024-02-26 | End: 1900-01-01

## 2024-02-26 NOTE — PHYSICAL EXAM
[No Acute Distress] : no acute distress [Well Nourished] : well nourished [Well Developed] : well developed [Well-Appearing] : well-appearing [Normal Sclera/Conjunctiva] : normal sclera/conjunctiva [PERRL] : pupils equal round and reactive to light [EOMI] : extraocular movements intact [Normal Outer Ear/Nose] : the outer ears and nose were normal in appearance [Normal Oropharynx] : the oropharynx was normal [No Lymphadenopathy] : no lymphadenopathy [No JVD] : no jugular venous distention [Supple] : supple [Thyroid Normal, No Nodules] : the thyroid was normal and there were no nodules present [No Respiratory Distress] : no respiratory distress  [No Accessory Muscle Use] : no accessory muscle use [Normal Rate] : normal rate  [Clear to Auscultation] : lungs were clear to auscultation bilaterally [Regular Rhythm] : with a regular rhythm [Normal S1, S2] : normal S1 and S2 [No Murmur] : no murmur heard [No Carotid Bruits] : no carotid bruits [No Abdominal Bruit] : a ~M bruit was not heard ~T in the abdomen [No Varicosities] : no varicosities [Pedal Pulses Present] : the pedal pulses are present [No Edema] : there was no peripheral edema [No Palpable Aorta] : no palpable aorta [No Extremity Clubbing/Cyanosis] : no extremity clubbing/cyanosis [Soft] : abdomen soft [Non Tender] : non-tender [Non-distended] : non-distended [No Masses] : no abdominal mass palpated [No HSM] : no HSM [Normal Bowel Sounds] : normal bowel sounds [Normal Posterior Cervical Nodes] : no posterior cervical lymphadenopathy [Normal Anterior Cervical Nodes] : no anterior cervical lymphadenopathy [No CVA Tenderness] : no CVA  tenderness [No Joint Swelling] : no joint swelling [No Spinal Tenderness] : no spinal tenderness [Grossly Normal Strength/Tone] : grossly normal strength/tone [No Rash] : no rash [Coordination Grossly Intact] : coordination grossly intact [No Focal Deficits] : no focal deficits [Normal Gait] : normal gait [Deep Tendon Reflexes (DTR)] : deep tendon reflexes were 2+ and symmetric [Normal Affect] : the affect was normal [Normal Insight/Judgement] : insight and judgment were intact

## 2024-02-29 NOTE — INTERPRETER SERVICES
[Patient Declined  Services] : - None: Patient declined  services [FreeTextEntry3] : Patient declined professional  offered as physician is fluent in Korean. [TWNoteComboBox1] : [place]

## 2024-02-29 NOTE — PLAN
[FreeTextEntry1] : #HCM  - discussed prior labs with her today  #T2DM - A1C today - Increased Trulicity to 3mg q week for DM and weight management  #Menopause/Sexual Dysfunction - referral to Women's Health provided - Pt declined follow up w/ BH therapy  #Fatty liver disease - Fibroscan CAP score 331 || Grade 3 steatosis w/ 67% or more liver with fatty change - kPA 8.1  Discussed w/ Dr Vasquez

## 2024-02-29 NOTE — HISTORY OF PRESENT ILLNESS
[FreeTextEntry1] : wanted to know her lab results [de-identified] : 47F with PMH of T2DM, obesity, hepatic steatosis, chronic let hip pain presented due to wanting to know the results of her last labs. No acute complaint. Patient continues to have same sexual dysfunction sxs and menopause; did not visit women's health.

## 2024-03-04 DIAGNOSIS — E11.9 TYPE 2 DIABETES MELLITUS WITHOUT COMPLICATIONS: ICD-10-CM

## 2024-03-04 DIAGNOSIS — F52.9 UNSPECIFIED SEXUAL DYSFUNCTION NOT DUE TO A SUBSTANCE OR KNOWN PHYSIOLOGICAL CONDITION: ICD-10-CM

## 2024-03-04 DIAGNOSIS — K76.0 FATTY (CHANGE OF) LIVER, NOT ELSEWHERE CLASSIFIED: ICD-10-CM

## 2024-04-01 NOTE — ED PROVIDER NOTE - NS_EDPROVIDERDISPOUSERTYPE_ED_A_ED
Jaspal Betancourt) I have personally evaluated and examined the patient. The Attending was available to me as a supervising provider if needed.

## 2024-12-06 ENCOUNTER — LABORATORY RESULT (OUTPATIENT)
Age: 49
End: 2024-12-06

## 2024-12-06 ENCOUNTER — APPOINTMENT (OUTPATIENT)
Dept: INTERNAL MEDICINE | Facility: CLINIC | Age: 49
End: 2024-12-06
Payer: COMMERCIAL

## 2024-12-06 ENCOUNTER — OUTPATIENT (OUTPATIENT)
Dept: OUTPATIENT SERVICES | Facility: HOSPITAL | Age: 49
LOS: 1 days | End: 2024-12-06
Payer: SELF-PAY

## 2024-12-06 ENCOUNTER — MED ADMIN CHARGE (OUTPATIENT)
Age: 49
End: 2024-12-06

## 2024-12-06 VITALS
HEIGHT: 60 IN | WEIGHT: 170 LBS | HEART RATE: 68 BPM | DIASTOLIC BLOOD PRESSURE: 76 MMHG | OXYGEN SATURATION: 98 % | BODY MASS INDEX: 33.38 KG/M2 | SYSTOLIC BLOOD PRESSURE: 120 MMHG

## 2024-12-06 DIAGNOSIS — Z00.00 ENCOUNTER FOR GENERAL ADULT MEDICAL EXAMINATION W/OUT ABNORMAL FINDINGS: ICD-10-CM

## 2024-12-06 DIAGNOSIS — Z98.51 TUBAL LIGATION STATUS: Chronic | ICD-10-CM

## 2024-12-06 DIAGNOSIS — E66.812 OBESITY, CLASS 2: ICD-10-CM

## 2024-12-06 DIAGNOSIS — Z23 ENCOUNTER FOR IMMUNIZATION: ICD-10-CM

## 2024-12-06 DIAGNOSIS — I10 ESSENTIAL (PRIMARY) HYPERTENSION: ICD-10-CM

## 2024-12-06 DIAGNOSIS — E11.9 TYPE 2 DIABETES MELLITUS W/OUT COMPLICATIONS: ICD-10-CM

## 2024-12-06 DIAGNOSIS — Z90.49 ACQUIRED ABSENCE OF OTHER SPECIFIED PARTS OF DIGESTIVE TRACT: Chronic | ICD-10-CM

## 2024-12-06 DIAGNOSIS — K76.0 FATTY (CHANGE OF) LIVER, NOT ELSEWHERE CLASSIFIED: ICD-10-CM

## 2024-12-06 LAB
ALBUMIN SERPL ELPH-MCNC: 4.6 G/DL
ALP BLD-CCNC: 184 U/L
ALT SERPL-CCNC: 73 U/L
ANION GAP SERPL CALC-SCNC: 11 MMOL/L
AST SERPL-CCNC: 66 U/L
BASOPHILS # BLD AUTO: 0.02 K/UL
BASOPHILS NFR BLD AUTO: 0.3 %
BILIRUB SERPL-MCNC: 0.5 MG/DL
BUN SERPL-MCNC: 27 MG/DL
CALCIUM SERPL-MCNC: 9.8 MG/DL
CHLORIDE SERPL-SCNC: 100 MMOL/L
CHOLEST SERPL-MCNC: 210 MG/DL
CO2 SERPL-SCNC: 25 MMOL/L
CREAT SERPL-MCNC: 0.52 MG/DL
EGFR: 112 ML/MIN/1.73M2
EOSINOPHIL # BLD AUTO: 0.23 K/UL
EOSINOPHIL NFR BLD AUTO: 3.5 %
GLUCOSE SERPL-MCNC: 292 MG/DL
HCT VFR BLD CALC: 45.9 %
HDLC SERPL-MCNC: 62 MG/DL
HGB BLD-MCNC: 15.1 G/DL
IMM GRANULOCYTES NFR BLD AUTO: 0.2 %
LDLC SERPL CALC-MCNC: 118 MG/DL
LYMPHOCYTES # BLD AUTO: 3.78 K/UL
LYMPHOCYTES NFR BLD AUTO: 57.4 %
MAN DIFF?: NORMAL
MCHC RBC-ENTMCNC: 28.8 PG
MCHC RBC-ENTMCNC: 32.9 G/DL
MCV RBC AUTO: 87.6 FL
MONOCYTES # BLD AUTO: 0.37 K/UL
MONOCYTES NFR BLD AUTO: 5.6 %
NEUTROPHILS # BLD AUTO: 2.18 K/UL
NEUTROPHILS NFR BLD AUTO: 33 %
NONHDLC SERPL-MCNC: 147 MG/DL
PLATELET # BLD AUTO: 135 K/UL
POTASSIUM SERPL-SCNC: 4.8 MMOL/L
PROT SERPL-MCNC: 7.8 G/DL
RBC # BLD: 5.24 M/UL
RBC # FLD: 13.6 %
SODIUM SERPL-SCNC: 137 MMOL/L
TRIGL SERPL-MCNC: 167 MG/DL
WBC # FLD AUTO: 6.59 K/UL

## 2024-12-06 PROCEDURE — 86708 HEPATITIS A ANTIBODY: CPT

## 2024-12-06 PROCEDURE — 83036 HEMOGLOBIN GLYCOSYLATED A1C: CPT

## 2024-12-06 PROCEDURE — 87340 HEPATITIS B SURFACE AG IA: CPT

## 2024-12-06 PROCEDURE — 86704 HEP B CORE ANTIBODY TOTAL: CPT

## 2024-12-06 PROCEDURE — 86709 HEPATITIS A IGM ANTIBODY: CPT

## 2024-12-06 PROCEDURE — 99213 OFFICE O/P EST LOW 20 MIN: CPT | Mod: GE

## 2024-12-06 PROCEDURE — 86706 HEP B SURFACE ANTIBODY: CPT

## 2024-12-06 PROCEDURE — 85025 COMPLETE CBC W/AUTO DIFF WBC: CPT

## 2024-12-06 PROCEDURE — G0463: CPT

## 2024-12-06 PROCEDURE — 90656 IIV3 VACC NO PRSV 0.5 ML IM: CPT

## 2024-12-06 PROCEDURE — 80061 LIPID PANEL: CPT

## 2024-12-06 PROCEDURE — 80053 COMPREHEN METABOLIC PANEL: CPT

## 2024-12-06 PROCEDURE — G0008: CPT

## 2024-12-06 RX ORDER — DULAGLUTIDE 1.5 MG/.5ML
1.5 INJECTION, SOLUTION SUBCUTANEOUS
Qty: 4 | Refills: 2 | Status: ACTIVE | COMMUNITY
Start: 2024-12-06 | End: 1900-01-01

## 2024-12-09 DIAGNOSIS — E66.812 OBESITY, CLASS 2: ICD-10-CM

## 2024-12-09 DIAGNOSIS — K76.0 FATTY (CHANGE OF) LIVER, NOT ELSEWHERE CLASSIFIED: ICD-10-CM

## 2024-12-09 DIAGNOSIS — Z00.00 ENCOUNTER FOR GENERAL ADULT MEDICAL EXAMINATION WITHOUT ABNORMAL FINDINGS: ICD-10-CM

## 2024-12-09 DIAGNOSIS — Z23 ENCOUNTER FOR IMMUNIZATION: ICD-10-CM

## 2024-12-09 DIAGNOSIS — E11.9 TYPE 2 DIABETES MELLITUS WITHOUT COMPLICATIONS: ICD-10-CM

## 2024-12-11 LAB
ESTIMATED AVERAGE GLUCOSE: 286 MG/DL
HBA1C MFR BLD HPLC: 11.6 %
HBV CORE IGG+IGM SER QL: NONREACTIVE
HBV SURFACE AB SER QL: NONREACTIVE
HBV SURFACE AG SER QL: NONREACTIVE
HEPATITIS A IGG ANTIBODY: REACTIVE

## 2024-12-28 ENCOUNTER — RESULT REVIEW (OUTPATIENT)
Age: 49
End: 2024-12-28

## 2024-12-28 ENCOUNTER — APPOINTMENT (OUTPATIENT)
Dept: MAMMOGRAPHY | Facility: IMAGING CENTER | Age: 49
End: 2024-12-28
Payer: SELF-PAY

## 2024-12-28 ENCOUNTER — OUTPATIENT (OUTPATIENT)
Dept: OUTPATIENT SERVICES | Facility: HOSPITAL | Age: 49
LOS: 1 days | End: 2024-12-28
Payer: SELF-PAY

## 2024-12-28 DIAGNOSIS — Z00.00 ENCOUNTER FOR GENERAL ADULT MEDICAL EXAMINATION WITHOUT ABNORMAL FINDINGS: ICD-10-CM

## 2024-12-28 DIAGNOSIS — Z90.49 ACQUIRED ABSENCE OF OTHER SPECIFIED PARTS OF DIGESTIVE TRACT: Chronic | ICD-10-CM

## 2024-12-28 DIAGNOSIS — Z98.51 TUBAL LIGATION STATUS: Chronic | ICD-10-CM

## 2024-12-28 PROCEDURE — 77067 SCR MAMMO BI INCL CAD: CPT | Mod: 26

## 2024-12-28 PROCEDURE — 77063 BREAST TOMOSYNTHESIS BI: CPT

## 2024-12-28 PROCEDURE — 77063 BREAST TOMOSYNTHESIS BI: CPT | Mod: 26

## 2024-12-28 PROCEDURE — 77067 SCR MAMMO BI INCL CAD: CPT

## 2025-03-21 ENCOUNTER — APPOINTMENT (OUTPATIENT)
Dept: INTERNAL MEDICINE | Facility: CLINIC | Age: 50
End: 2025-03-21
Payer: COMMERCIAL

## 2025-03-21 ENCOUNTER — OUTPATIENT (OUTPATIENT)
Dept: OUTPATIENT SERVICES | Facility: HOSPITAL | Age: 50
LOS: 1 days | End: 2025-03-21
Payer: SELF-PAY

## 2025-03-21 VITALS
OXYGEN SATURATION: 98 % | HEART RATE: 70 BPM | DIASTOLIC BLOOD PRESSURE: 90 MMHG | BODY MASS INDEX: 33.38 KG/M2 | SYSTOLIC BLOOD PRESSURE: 130 MMHG | WEIGHT: 170 LBS | HEIGHT: 60 IN

## 2025-03-21 DIAGNOSIS — I10 ESSENTIAL (PRIMARY) HYPERTENSION: ICD-10-CM

## 2025-03-21 DIAGNOSIS — E78.5 HYPERLIPIDEMIA, UNSPECIFIED: ICD-10-CM

## 2025-03-21 DIAGNOSIS — M19.90 UNSPECIFIED OSTEOARTHRITIS, UNSPECIFIED SITE: ICD-10-CM

## 2025-03-21 DIAGNOSIS — H60.90 UNSPECIFIED OTITIS EXTERNA, UNSPECIFIED EAR: ICD-10-CM

## 2025-03-21 DIAGNOSIS — Z90.49 ACQUIRED ABSENCE OF OTHER SPECIFIED PARTS OF DIGESTIVE TRACT: Chronic | ICD-10-CM

## 2025-03-21 DIAGNOSIS — E11.9 TYPE 2 DIABETES MELLITUS W/OUT COMPLICATIONS: ICD-10-CM

## 2025-03-21 DIAGNOSIS — Z98.51 TUBAL LIGATION STATUS: Chronic | ICD-10-CM

## 2025-03-21 DIAGNOSIS — Z00.00 ENCOUNTER FOR GENERAL ADULT MEDICAL EXAMINATION W/OUT ABNORMAL FINDINGS: ICD-10-CM

## 2025-03-21 LAB — HBA1C MFR BLD HPLC: 8.5

## 2025-03-21 PROCEDURE — 83036 HEMOGLOBIN GLYCOSYLATED A1C: CPT

## 2025-03-21 PROCEDURE — 99213 OFFICE O/P EST LOW 20 MIN: CPT | Mod: GE

## 2025-03-21 PROCEDURE — G0463: CPT

## 2025-03-21 RX ORDER — CIPROFLOXACIN AND DEXAMETHASONE 3; 1 MG/ML; MG/ML
0.3-0.1 SUSPENSION/ DROPS AURICULAR (OTIC) TWICE DAILY
Qty: 1 | Refills: 0 | Status: ACTIVE | COMMUNITY
Start: 2025-03-21 | End: 1900-01-01

## 2025-03-24 DIAGNOSIS — H60.90 UNSPECIFIED OTITIS EXTERNA, UNSPECIFIED EAR: ICD-10-CM

## 2025-03-24 DIAGNOSIS — M19.90 UNSPECIFIED OSTEOARTHRITIS, UNSPECIFIED SITE: ICD-10-CM

## 2025-03-24 DIAGNOSIS — E78.5 HYPERLIPIDEMIA, UNSPECIFIED: ICD-10-CM

## 2025-03-24 DIAGNOSIS — E11.9 TYPE 2 DIABETES MELLITUS WITHOUT COMPLICATIONS: ICD-10-CM

## 2025-04-08 ENCOUNTER — APPOINTMENT (OUTPATIENT)
Dept: OBGYN | Facility: CLINIC | Age: 50
End: 2025-04-08

## 2025-04-28 ENCOUNTER — NON-APPOINTMENT (OUTPATIENT)
Age: 50
End: 2025-04-28

## 2025-04-28 ENCOUNTER — APPOINTMENT (OUTPATIENT)
Dept: OBGYN | Facility: CLINIC | Age: 50
End: 2025-04-28
Payer: COMMERCIAL

## 2025-04-28 ENCOUNTER — RESULT REVIEW (OUTPATIENT)
Age: 50
End: 2025-04-28

## 2025-04-28 ENCOUNTER — OUTPATIENT (OUTPATIENT)
Dept: OUTPATIENT SERVICES | Facility: HOSPITAL | Age: 50
LOS: 1 days | End: 2025-04-28
Payer: SELF-PAY

## 2025-04-28 VITALS — DIASTOLIC BLOOD PRESSURE: 80 MMHG | SYSTOLIC BLOOD PRESSURE: 126 MMHG | WEIGHT: 169 LBS | BODY MASS INDEX: 33.01 KG/M2

## 2025-04-28 DIAGNOSIS — Z78.0 ASYMPTOMATIC MENOPAUSAL STATE: ICD-10-CM

## 2025-04-28 DIAGNOSIS — Z98.51 TUBAL LIGATION STATUS: Chronic | ICD-10-CM

## 2025-04-28 DIAGNOSIS — Z90.49 ACQUIRED ABSENCE OF OTHER SPECIFIED PARTS OF DIGESTIVE TRACT: Chronic | ICD-10-CM

## 2025-04-28 DIAGNOSIS — Z00.00 ENCOUNTER FOR GENERAL ADULT MEDICAL EXAMINATION W/OUT ABNORMAL FINDINGS: ICD-10-CM

## 2025-04-28 PROCEDURE — G0463: CPT

## 2025-04-28 PROCEDURE — 99386 PREV VISIT NEW AGE 40-64: CPT | Mod: GC

## 2025-04-29 DIAGNOSIS — N76.0 ACUTE VAGINITIS: ICD-10-CM

## 2025-05-09 DIAGNOSIS — Z00.00 ENCOUNTER FOR GENERAL ADULT MEDICAL EXAMINATION WITHOUT ABNORMAL FINDINGS: ICD-10-CM

## 2025-05-30 ENCOUNTER — APPOINTMENT (OUTPATIENT)
Dept: INTERNAL MEDICINE | Facility: CLINIC | Age: 50
End: 2025-05-30

## 2025-07-15 ENCOUNTER — APPOINTMENT (OUTPATIENT)
Dept: GASTROENTEROLOGY | Facility: HOSPITAL | Age: 50
End: 2025-07-15

## 2025-07-29 ENCOUNTER — OUTPATIENT (OUTPATIENT)
Dept: OUTPATIENT SERVICES | Facility: HOSPITAL | Age: 50
LOS: 1 days | End: 2025-07-29
Payer: SELF-PAY

## 2025-07-29 ENCOUNTER — APPOINTMENT (OUTPATIENT)
Dept: GASTROENTEROLOGY | Facility: HOSPITAL | Age: 50
End: 2025-07-29
Payer: COMMERCIAL

## 2025-07-29 VITALS
BODY MASS INDEX: 34.36 KG/M2 | RESPIRATION RATE: 16 BRPM | HEART RATE: 64 BPM | TEMPERATURE: 98 F | WEIGHT: 175 LBS | HEIGHT: 60 IN | OXYGEN SATURATION: 98 % | DIASTOLIC BLOOD PRESSURE: 84 MMHG | SYSTOLIC BLOOD PRESSURE: 164 MMHG

## 2025-07-29 DIAGNOSIS — Z00.00 ENCOUNTER FOR GENERAL ADULT MEDICAL EXAMINATION W/OUT ABNORMAL FINDINGS: ICD-10-CM

## 2025-07-29 DIAGNOSIS — E66.812 OBESITY, CLASS 2: ICD-10-CM

## 2025-07-29 DIAGNOSIS — K76.0 FATTY (CHANGE OF) LIVER, NOT ELSEWHERE CLASSIFIED: ICD-10-CM

## 2025-07-29 DIAGNOSIS — E11.9 TYPE 2 DIABETES MELLITUS WITHOUT COMPLICATIONS: ICD-10-CM

## 2025-07-29 DIAGNOSIS — E11.9 TYPE 2 DIABETES MELLITUS W/OUT COMPLICATIONS: ICD-10-CM

## 2025-07-29 DIAGNOSIS — Z00.00 ENCOUNTER FOR GENERAL ADULT MEDICAL EXAMINATION WITHOUT ABNORMAL FINDINGS: ICD-10-CM

## 2025-07-29 DIAGNOSIS — Z90.49 ACQUIRED ABSENCE OF OTHER SPECIFIED PARTS OF DIGESTIVE TRACT: Chronic | ICD-10-CM

## 2025-07-29 DIAGNOSIS — D12.6 BENIGN NEOPLASM OF COLON, UNSPECIFIED: ICD-10-CM

## 2025-07-29 DIAGNOSIS — Z82.69 FAMILY HISTORY OF OTHER DISEASES OF THE MUSCULOSKELETAL SYSTEM AND CONNECTIVE TISSUE: ICD-10-CM

## 2025-07-29 DIAGNOSIS — Z80.42 FAMILY HISTORY OF MALIGNANT NEOPLASM OF PROSTATE: ICD-10-CM

## 2025-07-29 DIAGNOSIS — Z98.51 TUBAL LIGATION STATUS: Chronic | ICD-10-CM

## 2025-07-29 LAB
ALBUMIN SERPL ELPH-MCNC: 4.6 G/DL — SIGNIFICANT CHANGE UP (ref 3.3–5)
ALP SERPL-CCNC: 247 U/L — HIGH (ref 40–120)
ALT FLD-CCNC: 112 U/L — HIGH (ref 10–40)
ANION GAP SERPL CALC-SCNC: 13 MMOL/L — SIGNIFICANT CHANGE UP (ref 5–17)
APTT BLD: 39.4 SEC — HIGH (ref 26.1–36.8)
AST SERPL-CCNC: 118 U/L — HIGH (ref 10–35)
BILIRUB SERPL-MCNC: 0.3 MG/DL — SIGNIFICANT CHANGE UP (ref 0.2–1.2)
BUN SERPL-MCNC: 18 MG/DL — SIGNIFICANT CHANGE UP (ref 7–23)
CALCIUM SERPL-MCNC: 10.3 MG/DL — SIGNIFICANT CHANGE UP (ref 8.4–10.5)
CHLORIDE SERPL-SCNC: 100 MMOL/L — SIGNIFICANT CHANGE UP (ref 96–108)
CK SERPL-CCNC: 65 U/L — SIGNIFICANT CHANGE UP (ref 25–170)
CO2 SERPL-SCNC: 26 MMOL/L — SIGNIFICANT CHANGE UP (ref 22–31)
CREAT SERPL-MCNC: 0.49 MG/DL — LOW (ref 0.5–1.3)
EGFR: 113 ML/MIN/1.73M2 — SIGNIFICANT CHANGE UP
EGFR: 113 ML/MIN/1.73M2 — SIGNIFICANT CHANGE UP
GGT SERPL-CCNC: 219 U/L — HIGH (ref 8–40)
GLUCOSE SERPL-MCNC: 289 MG/DL — HIGH (ref 70–99)
HCT VFR BLD CALC: 43.6 % — SIGNIFICANT CHANGE UP (ref 34.5–45)
HGB BLD-MCNC: 14.1 G/DL — SIGNIFICANT CHANGE UP (ref 11.5–15.5)
INR BLD: 1.03 RATIO — SIGNIFICANT CHANGE UP (ref 0.85–1.16)
MAGNESIUM SERPL-MCNC: 1.9 MG/DL — SIGNIFICANT CHANGE UP (ref 1.6–2.6)
MCHC RBC-ENTMCNC: 28.4 PG — SIGNIFICANT CHANGE UP (ref 27–34)
MCHC RBC-ENTMCNC: 32.3 G/DL — SIGNIFICANT CHANGE UP (ref 32–36)
MCV RBC AUTO: 87.9 FL — SIGNIFICANT CHANGE UP (ref 80–100)
PHOSPHATE SERPL-MCNC: 3.6 MG/DL — SIGNIFICANT CHANGE UP (ref 2.5–4.5)
PLATELET # BLD AUTO: 140 K/UL — LOW (ref 150–400)
POTASSIUM SERPL-MCNC: 5.2 MMOL/L — SIGNIFICANT CHANGE UP (ref 3.5–5.3)
POTASSIUM SERPL-SCNC: 5.2 MMOL/L — SIGNIFICANT CHANGE UP (ref 3.5–5.3)
PROT SERPL-MCNC: 7.9 G/DL — SIGNIFICANT CHANGE UP (ref 6–8.3)
PROTHROM AB SERPL-ACNC: 12.1 SEC — SIGNIFICANT CHANGE UP (ref 9.9–13.4)
RBC # BLD: 4.96 M/UL — SIGNIFICANT CHANGE UP (ref 3.8–5.2)
RBC # FLD: 14.4 % — SIGNIFICANT CHANGE UP (ref 10.3–14.5)
SODIUM SERPL-SCNC: 139 MMOL/L — SIGNIFICANT CHANGE UP (ref 135–145)
WBC # BLD: 5.94 K/UL — SIGNIFICANT CHANGE UP (ref 3.8–10.5)
WBC # FLD AUTO: 5.94 K/UL — SIGNIFICANT CHANGE UP (ref 3.8–10.5)

## 2025-07-29 PROCEDURE — 84100 ASSAY OF PHOSPHORUS: CPT

## 2025-07-29 PROCEDURE — ZZZZZ: CPT

## 2025-07-29 PROCEDURE — 83735 ASSAY OF MAGNESIUM: CPT

## 2025-07-29 PROCEDURE — 85730 THROMBOPLASTIN TIME PARTIAL: CPT

## 2025-07-29 PROCEDURE — 85610 PROTHROMBIN TIME: CPT

## 2025-07-29 PROCEDURE — 36415 COLL VENOUS BLD VENIPUNCTURE: CPT

## 2025-07-29 PROCEDURE — 85027 COMPLETE CBC AUTOMATED: CPT

## 2025-07-29 PROCEDURE — 82550 ASSAY OF CK (CPK): CPT

## 2025-07-29 PROCEDURE — G0463: CPT

## 2025-07-29 PROCEDURE — 82977 ASSAY OF GGT: CPT

## 2025-07-29 PROCEDURE — 80053 COMPREHEN METABOLIC PANEL: CPT

## 2025-07-29 RX ORDER — SIMETHICONE 80 MG/1
80 TABLET, CHEWABLE ORAL
Qty: 4 | Refills: 0 | Status: ACTIVE | COMMUNITY
Start: 2025-07-29 | End: 1900-01-01

## 2025-07-29 RX ORDER — POLYETHYLENE GLYCOL 3350 AND ELECTROLYTES WITH LEMON FLAVOR 236; 22.74; 6.74; 5.86; 2.97 G/4L; G/4L; G/4L; G/4L; G/4L
236 POWDER, FOR SOLUTION ORAL
Qty: 1 | Refills: 0 | Status: ACTIVE | COMMUNITY
Start: 2025-07-29 | End: 1900-01-01

## 2025-08-01 ENCOUNTER — APPOINTMENT (OUTPATIENT)
Dept: ULTRASOUND IMAGING | Facility: CLINIC | Age: 50
End: 2025-08-01
Payer: SELF-PAY

## 2025-08-01 ENCOUNTER — OUTPATIENT (OUTPATIENT)
Dept: OUTPATIENT SERVICES | Facility: HOSPITAL | Age: 50
LOS: 1 days | End: 2025-08-01
Payer: SELF-PAY

## 2025-08-01 DIAGNOSIS — Z80.42 FAMILY HISTORY OF MALIGNANT NEOPLASM OF PROSTATE: ICD-10-CM

## 2025-08-01 DIAGNOSIS — D12.6 BENIGN NEOPLASM OF COLON, UNSPECIFIED: ICD-10-CM

## 2025-08-01 DIAGNOSIS — E66.812 OBESITY, CLASS 2: ICD-10-CM

## 2025-08-01 DIAGNOSIS — Z00.00 ENCOUNTER FOR GENERAL ADULT MEDICAL EXAMINATION WITHOUT ABNORMAL FINDINGS: ICD-10-CM

## 2025-08-01 DIAGNOSIS — Z90.49 ACQUIRED ABSENCE OF OTHER SPECIFIED PARTS OF DIGESTIVE TRACT: Chronic | ICD-10-CM

## 2025-08-01 DIAGNOSIS — Z98.51 TUBAL LIGATION STATUS: Chronic | ICD-10-CM

## 2025-08-01 DIAGNOSIS — K76.0 FATTY (CHANGE OF) LIVER, NOT ELSEWHERE CLASSIFIED: ICD-10-CM

## 2025-08-01 DIAGNOSIS — E11.9 TYPE 2 DIABETES MELLITUS WITHOUT COMPLICATIONS: ICD-10-CM

## 2025-08-01 DIAGNOSIS — Z82.69 FAMILY HISTORY OF OTHER DISEASES OF THE MUSCULOSKELETAL SYSTEM AND CONNECTIVE TISSUE: ICD-10-CM

## 2025-08-01 PROCEDURE — 76705 ECHO EXAM OF ABDOMEN: CPT

## 2025-08-01 PROCEDURE — 76705 ECHO EXAM OF ABDOMEN: CPT | Mod: 26
